# Patient Record
Sex: FEMALE | Race: OTHER | URBAN - METROPOLITAN AREA
[De-identification: names, ages, dates, MRNs, and addresses within clinical notes are randomized per-mention and may not be internally consistent; named-entity substitution may affect disease eponyms.]

---

## 2017-02-04 ENCOUNTER — EMERGENCY (EMERGENCY)
Facility: HOSPITAL | Age: 52
LOS: 0 days | Discharge: HOME | End: 2017-02-04
Attending: EMERGENCY MEDICINE | Admitting: EMERGENCY MEDICINE
Payer: MEDICAID

## 2017-02-04 VITALS
TEMPERATURE: 93 F | SYSTOLIC BLOOD PRESSURE: 142 MMHG | DIASTOLIC BLOOD PRESSURE: 81 MMHG | HEART RATE: 91 BPM | RESPIRATION RATE: 19 BRPM

## 2017-02-04 VITALS — WEIGHT: 164.02 LBS | HEIGHT: 66 IN

## 2017-02-04 DIAGNOSIS — J11.1 INFLUENZA DUE TO UNIDENTIFIED INFLUENZA VIRUS WITH OTHER RESPIRATORY MANIFESTATIONS: ICD-10-CM

## 2017-02-04 LAB
ALBUMIN SERPL ELPH-MCNC: 3.9 G/DL — SIGNIFICANT CHANGE UP (ref 3.3–5)
ALP SERPL-CCNC: 91 U/L — SIGNIFICANT CHANGE UP (ref 40–120)
ALT FLD-CCNC: 41 U/L — SIGNIFICANT CHANGE UP (ref 12–78)
ANION GAP SERPL CALC-SCNC: 9 MMOL/L — SIGNIFICANT CHANGE UP (ref 5–17)
APPEARANCE UR: CLEAR — SIGNIFICANT CHANGE UP
APTT BLD: 32.9 SEC — SIGNIFICANT CHANGE UP (ref 27.5–37.4)
AST SERPL-CCNC: 31 U/L — SIGNIFICANT CHANGE UP (ref 15–37)
BACTERIA # UR AUTO: (no result)
BASOPHILS # BLD AUTO: 0.1 K/UL — SIGNIFICANT CHANGE UP (ref 0–0.2)
BASOPHILS NFR BLD AUTO: 0.8 % — SIGNIFICANT CHANGE UP (ref 0–2)
BILIRUB SERPL-MCNC: 0.5 MG/DL — SIGNIFICANT CHANGE UP (ref 0.2–1.2)
BILIRUB UR-MCNC: NEGATIVE — SIGNIFICANT CHANGE UP
BLD GP AB SCN SERPL QL: SIGNIFICANT CHANGE UP
BUN SERPL-MCNC: 11 MG/DL — SIGNIFICANT CHANGE UP (ref 7–23)
CALCIUM SERPL-MCNC: 9.2 MG/DL — SIGNIFICANT CHANGE UP (ref 8.5–10.1)
CHLORIDE SERPL-SCNC: 103 MMOL/L — SIGNIFICANT CHANGE UP (ref 96–108)
CO2 SERPL-SCNC: 26 MMOL/L — SIGNIFICANT CHANGE UP (ref 22–31)
COLOR SPEC: YELLOW — SIGNIFICANT CHANGE UP
CREAT SERPL-MCNC: 0.82 MG/DL — SIGNIFICANT CHANGE UP (ref 0.5–1.3)
DIFF PNL FLD: (no result)
EOSINOPHIL # BLD AUTO: 0.1 K/UL — SIGNIFICANT CHANGE UP (ref 0–0.5)
EOSINOPHIL NFR BLD AUTO: 0.7 % — SIGNIFICANT CHANGE UP (ref 0–6)
EPI CELLS # UR: (no result)
FLUAV H1 2009 PAND RNA SPEC QL NAA+PROBE: DETECTED
GLUCOSE SERPL-MCNC: 98 MG/DL — SIGNIFICANT CHANGE UP (ref 70–99)
GLUCOSE UR QL: NEGATIVE MG/DL — SIGNIFICANT CHANGE UP
HCT VFR BLD CALC: 46.4 % — HIGH (ref 34.5–45)
HGB BLD-MCNC: 15.5 G/DL — SIGNIFICANT CHANGE UP (ref 11.5–15.5)
INR BLD: 1.29 RATIO — HIGH (ref 0.88–1.16)
KETONES UR-MCNC: (no result)
LACTATE SERPL-SCNC: 1.3 MMOL/L — SIGNIFICANT CHANGE UP (ref 0.7–2)
LEUKOCYTE ESTERASE UR-ACNC: (no result)
LYMPHOCYTES # BLD AUTO: 13.2 % — SIGNIFICANT CHANGE UP (ref 13–44)
LYMPHOCYTES # BLD AUTO: 2 K/UL — SIGNIFICANT CHANGE UP (ref 1–3.3)
MCHC RBC-ENTMCNC: 30.3 PG — SIGNIFICANT CHANGE UP (ref 27–34)
MCHC RBC-ENTMCNC: 33.4 GM/DL — SIGNIFICANT CHANGE UP (ref 32–36)
MCV RBC AUTO: 90.9 FL — SIGNIFICANT CHANGE UP (ref 80–100)
MONOCYTES # BLD AUTO: 1.1 K/UL — HIGH (ref 0–0.9)
MONOCYTES NFR BLD AUTO: 7.3 % — SIGNIFICANT CHANGE UP (ref 2–14)
NEUTROPHILS # BLD AUTO: 12 K/UL — HIGH (ref 1.8–7.4)
NEUTROPHILS NFR BLD AUTO: 78.1 % — HIGH (ref 43–77)
NITRITE UR-MCNC: NEGATIVE — SIGNIFICANT CHANGE UP
PH UR: 6 — SIGNIFICANT CHANGE UP (ref 4.8–8)
PLATELET # BLD AUTO: 254 K/UL — SIGNIFICANT CHANGE UP (ref 150–400)
POTASSIUM SERPL-MCNC: 3.9 MMOL/L — SIGNIFICANT CHANGE UP (ref 3.5–5.3)
POTASSIUM SERPL-SCNC: 3.9 MMOL/L — SIGNIFICANT CHANGE UP (ref 3.5–5.3)
PROT SERPL-MCNC: 9.1 GM/DL — HIGH (ref 6–8.3)
PROT UR-MCNC: 100 MG/DL
PROTHROM AB SERPL-ACNC: 14.2 SEC — HIGH (ref 10–13.1)
RAPID RVP RESULT: DETECTED
RBC # BLD: 5.1 M/UL — SIGNIFICANT CHANGE UP (ref 3.8–5.2)
RBC # FLD: 11.5 % — SIGNIFICANT CHANGE UP (ref 10.3–14.5)
RBC CASTS # UR COMP ASSIST: (no result) /HPF (ref 0–4)
SODIUM SERPL-SCNC: 138 MMOL/L — SIGNIFICANT CHANGE UP (ref 135–145)
SP GR SPEC: 1.01 — SIGNIFICANT CHANGE UP (ref 1.01–1.02)
TROPONIN I SERPL-MCNC: <0.015 NG/ML — SIGNIFICANT CHANGE UP (ref 0.01–0.04)
TYPE + AB SCN PNL BLD: SIGNIFICANT CHANGE UP
UROBILINOGEN FLD QL: NEGATIVE MG/DL — SIGNIFICANT CHANGE UP
WBC # BLD: 15.4 K/UL — HIGH (ref 3.8–10.5)
WBC # FLD AUTO: 15.4 K/UL — HIGH (ref 3.8–10.5)
WBC UR QL: SIGNIFICANT CHANGE UP

## 2017-02-04 PROCEDURE — 99284 EMERGENCY DEPT VISIT MOD MDM: CPT

## 2017-02-04 PROCEDURE — 71010: CPT | Mod: 26

## 2017-02-04 RX ORDER — ACETAMINOPHEN 500 MG
650 TABLET ORAL ONCE
Qty: 0 | Refills: 0 | Status: COMPLETED | OUTPATIENT
Start: 2017-02-04 | End: 2017-02-04

## 2017-02-04 RX ORDER — AZITHROMYCIN 500 MG/1
500 TABLET, FILM COATED ORAL ONCE
Qty: 0 | Refills: 0 | Status: COMPLETED | OUTPATIENT
Start: 2017-02-04 | End: 2017-02-04

## 2017-02-04 RX ORDER — CEFTRIAXONE 500 MG/1
1 INJECTION, POWDER, FOR SOLUTION INTRAMUSCULAR; INTRAVENOUS ONCE
Qty: 0 | Refills: 0 | Status: COMPLETED | OUTPATIENT
Start: 2017-02-04 | End: 2017-02-04

## 2017-02-04 RX ORDER — SODIUM CHLORIDE 9 MG/ML
3 INJECTION INTRAMUSCULAR; INTRAVENOUS; SUBCUTANEOUS ONCE
Qty: 0 | Refills: 0 | Status: DISCONTINUED | OUTPATIENT
Start: 2017-02-04 | End: 2017-02-04

## 2017-02-04 RX ORDER — SODIUM CHLORIDE 9 MG/ML
500 INJECTION INTRAMUSCULAR; INTRAVENOUS; SUBCUTANEOUS
Qty: 0 | Refills: 0 | Status: DISCONTINUED | OUTPATIENT
Start: 2017-02-04 | End: 2017-02-04

## 2017-02-04 RX ORDER — IBUPROFEN 200 MG
1 TABLET ORAL
Qty: 28 | Refills: 0 | OUTPATIENT
Start: 2017-02-04 | End: 2017-02-11

## 2017-02-04 RX ORDER — AZITHROMYCIN 500 MG/1
1 TABLET, FILM COATED ORAL
Qty: 4 | Refills: 0 | OUTPATIENT
Start: 2017-02-04 | End: 2017-02-08

## 2017-02-04 RX ADMIN — SODIUM CHLORIDE 2000 MILLILITER(S): 9 INJECTION INTRAMUSCULAR; INTRAVENOUS; SUBCUTANEOUS at 10:13

## 2017-02-04 RX ADMIN — CEFTRIAXONE 100 GRAM(S): 500 INJECTION, POWDER, FOR SOLUTION INTRAMUSCULAR; INTRAVENOUS at 10:17

## 2017-02-04 RX ADMIN — AZITHROMYCIN 255 MILLIGRAM(S): 500 TABLET, FILM COATED ORAL at 10:35

## 2017-02-04 RX ADMIN — SODIUM CHLORIDE 2000 MILLILITER(S): 9 INJECTION INTRAMUSCULAR; INTRAVENOUS; SUBCUTANEOUS at 10:08

## 2017-02-04 RX ADMIN — Medication 650 MILLIGRAM(S): at 10:06

## 2017-02-04 NOTE — ED PROVIDER NOTE - DETAILS:
I, Gretchen Corona, performed the initial face to face bedside interview with this patient regarding history of present illness, review of symptoms and relevant past medical, social and family history.  I completed an independent physical examination.  I was the initial provider who evaluated this patient. The history, relevant review of systems, past medical and surgical history, medical decision making, and physical examination was documented by the scribe in my presence and I attest to the accuracy of the documentation.

## 2017-02-04 NOTE — ED PROVIDER NOTE - OBJECTIVE STATEMENT
50 yo female with ho htn, not on any medications pw 7 days of fever, cough. no recent travel, no pmd, no allergies

## 2017-02-04 NOTE — ED ADULT NURSE NOTE - DOES PATIENT HAVE ADVANCE DIRECTIVE
Problem: Patient Care Overview (Adult)  Goal: Adult Individualization and Mutuality  Outcome: Ongoing (interventions implemented as appropriate)    01/21/17 2006   Individualization   Patient Specific Preferences To go home   Patient Specific Goals To go home   Patient Specific Interventions Monitoring blood pressures   Mutuality/Individual Preferences   What Anxieties, Fears or Concerns Do You Have About Your Health or Care? none   What Questions Do You Have About Your Health or Care? none   What Information Would Help Us Give You More Personalized Care? nothing at this time       Goal: Discharge Needs Assessment  Outcome: Ongoing (interventions implemented as appropriate)    01/21/17 2006   Discharge Needs Assessment   Concerns To Be Addressed no discharge needs identified   Readmission Within The Last 30 Days no previous admission in last 30 days   Equipment Needed After Discharge none   Discharge Disposition home or self-care   Current Health   Anticipated Changes Related to Illness none   Self-Care   Equipment Currently Used at Home none   Living Environment   Transportation Available car         Problem: Hypertensive Disorders in Pregnancy (Adult,Obstetrics,Pediatric)  Goal: Signs and Symptoms of Listed Potential Problems Will be Absent or Manageable (Hypertensive Disorders in Pregnancy)  Outcome: Ongoing (interventions implemented as appropriate)    01/21/17 2006   Hypertensive Disorders in Pregnancy   Problems Assessed (Hypertensive Disorders in Pregnancy) all   Problems Present (Hypertensive Disorders in Pregnancy) none            No

## 2017-02-04 NOTE — ED PROVIDER NOTE - MEDICAL DECISION MAKING DETAILS
labs, cxray r/o pna, hydration, if lfu too late to treat as pt has been symtomatic for 7 days, will treat for bronchitis and d/c to home

## 2017-02-05 LAB
CULTURE RESULTS: SIGNIFICANT CHANGE UP
SPECIMEN SOURCE: SIGNIFICANT CHANGE UP

## 2017-02-09 LAB
CULTURE RESULTS: SIGNIFICANT CHANGE UP
CULTURE RESULTS: SIGNIFICANT CHANGE UP
SPECIMEN SOURCE: SIGNIFICANT CHANGE UP
SPECIMEN SOURCE: SIGNIFICANT CHANGE UP

## 2018-04-30 ENCOUNTER — APPOINTMENT (OUTPATIENT)
Dept: ULTRASOUND IMAGING | Facility: CLINIC | Age: 53
End: 2018-04-30
Payer: COMMERCIAL

## 2018-04-30 ENCOUNTER — APPOINTMENT (OUTPATIENT)
Dept: MAMMOGRAPHY | Facility: CLINIC | Age: 53
End: 2018-04-30

## 2018-04-30 ENCOUNTER — OUTPATIENT (OUTPATIENT)
Dept: OUTPATIENT SERVICES | Facility: HOSPITAL | Age: 53
LOS: 1 days | End: 2018-04-30
Payer: COMMERCIAL

## 2018-04-30 ENCOUNTER — OUTPATIENT (OUTPATIENT)
Dept: OUTPATIENT SERVICES | Facility: HOSPITAL | Age: 53
LOS: 1 days | End: 2018-04-30

## 2018-04-30 DIAGNOSIS — Z00.8 ENCOUNTER FOR OTHER GENERAL EXAMINATION: ICD-10-CM

## 2018-04-30 PROCEDURE — 76700 US EXAM ABDOM COMPLETE: CPT | Mod: 26

## 2018-04-30 PROCEDURE — 76700 US EXAM ABDOM COMPLETE: CPT

## 2018-04-30 PROCEDURE — 77063 BREAST TOMOSYNTHESIS BI: CPT | Mod: 26

## 2018-04-30 PROCEDURE — 77063 BREAST TOMOSYNTHESIS BI: CPT

## 2018-04-30 PROCEDURE — 77067 SCR MAMMO BI INCL CAD: CPT | Mod: 26

## 2018-04-30 PROCEDURE — 77067 SCR MAMMO BI INCL CAD: CPT

## 2018-11-28 ENCOUNTER — EMERGENCY (EMERGENCY)
Facility: HOSPITAL | Age: 53
LOS: 0 days | Discharge: HOME | End: 2018-11-28
Attending: EMERGENCY MEDICINE
Payer: MEDICAID

## 2018-11-28 VITALS
OXYGEN SATURATION: 98 % | SYSTOLIC BLOOD PRESSURE: 128 MMHG | HEART RATE: 87 BPM | RESPIRATION RATE: 16 BRPM | DIASTOLIC BLOOD PRESSURE: 74 MMHG

## 2018-11-28 VITALS
OXYGEN SATURATION: 98 % | TEMPERATURE: 98 F | HEART RATE: 94 BPM | DIASTOLIC BLOOD PRESSURE: 81 MMHG | RESPIRATION RATE: 18 BRPM | SYSTOLIC BLOOD PRESSURE: 136 MMHG

## 2018-11-28 PROBLEM — I10 ESSENTIAL (PRIMARY) HYPERTENSION: Chronic | Status: ACTIVE | Noted: 2017-02-04

## 2018-11-28 LAB
ALBUMIN SERPL ELPH-MCNC: 4.3 G/DL — SIGNIFICANT CHANGE UP (ref 3.3–5)
ALP SERPL-CCNC: 124 U/L — HIGH (ref 40–120)
ALT FLD-CCNC: 93 U/L — HIGH (ref 12–78)
ANION GAP SERPL CALC-SCNC: 8 MMOL/L — SIGNIFICANT CHANGE UP (ref 5–17)
AST SERPL-CCNC: 53 U/L — HIGH (ref 15–37)
BASOPHILS # BLD AUTO: 0.07 K/UL — SIGNIFICANT CHANGE UP (ref 0–0.2)
BASOPHILS NFR BLD AUTO: 0.8 % — SIGNIFICANT CHANGE UP (ref 0–2)
BILIRUB SERPL-MCNC: 0.4 MG/DL — SIGNIFICANT CHANGE UP (ref 0.2–1.2)
BUN SERPL-MCNC: 12 MG/DL — SIGNIFICANT CHANGE UP (ref 7–23)
CALCIUM SERPL-MCNC: 9.4 MG/DL — SIGNIFICANT CHANGE UP (ref 8.5–10.1)
CHLORIDE SERPL-SCNC: 104 MMOL/L — SIGNIFICANT CHANGE UP (ref 96–108)
CO2 SERPL-SCNC: 29 MMOL/L — SIGNIFICANT CHANGE UP (ref 22–31)
CREAT SERPL-MCNC: 0.81 MG/DL — SIGNIFICANT CHANGE UP (ref 0.5–1.3)
EOSINOPHIL # BLD AUTO: 0.14 K/UL — SIGNIFICANT CHANGE UP (ref 0–0.5)
EOSINOPHIL NFR BLD AUTO: 1.6 % — SIGNIFICANT CHANGE UP (ref 0–6)
GLUCOSE SERPL-MCNC: 107 MG/DL — HIGH (ref 70–99)
HCT VFR BLD CALC: 46.9 % — HIGH (ref 34.5–45)
HGB BLD-MCNC: 15.9 G/DL — HIGH (ref 11.5–15.5)
IMM GRANULOCYTES NFR BLD AUTO: 0.3 % — SIGNIFICANT CHANGE UP (ref 0–1.5)
LYMPHOCYTES # BLD AUTO: 2.44 K/UL — SIGNIFICANT CHANGE UP (ref 1–3.3)
LYMPHOCYTES # BLD AUTO: 27.4 % — SIGNIFICANT CHANGE UP (ref 13–44)
MCHC RBC-ENTMCNC: 30.3 PG — SIGNIFICANT CHANGE UP (ref 27–34)
MCHC RBC-ENTMCNC: 33.9 GM/DL — SIGNIFICANT CHANGE UP (ref 32–36)
MCV RBC AUTO: 89.5 FL — SIGNIFICANT CHANGE UP (ref 80–100)
MONOCYTES # BLD AUTO: 0.6 K/UL — SIGNIFICANT CHANGE UP (ref 0–0.9)
MONOCYTES NFR BLD AUTO: 6.7 % — SIGNIFICANT CHANGE UP (ref 2–14)
NEUTROPHILS # BLD AUTO: 5.61 K/UL — SIGNIFICANT CHANGE UP (ref 1.8–7.4)
NEUTROPHILS NFR BLD AUTO: 63.2 % — SIGNIFICANT CHANGE UP (ref 43–77)
PLATELET # BLD AUTO: 243 K/UL — SIGNIFICANT CHANGE UP (ref 150–400)
POTASSIUM SERPL-MCNC: 3.8 MMOL/L — SIGNIFICANT CHANGE UP (ref 3.5–5.3)
POTASSIUM SERPL-SCNC: 3.8 MMOL/L — SIGNIFICANT CHANGE UP (ref 3.5–5.3)
PROT SERPL-MCNC: 8.9 GM/DL — HIGH (ref 6–8.3)
RBC # BLD: 5.24 M/UL — HIGH (ref 3.8–5.2)
RBC # FLD: 12.6 % — SIGNIFICANT CHANGE UP (ref 10.3–14.5)
SODIUM SERPL-SCNC: 141 MMOL/L — SIGNIFICANT CHANGE UP (ref 135–145)
WBC # BLD: 8.89 K/UL — SIGNIFICANT CHANGE UP (ref 3.8–10.5)
WBC # FLD AUTO: 8.89 K/UL — SIGNIFICANT CHANGE UP (ref 3.8–10.5)

## 2018-11-28 PROCEDURE — 70450 CT HEAD/BRAIN W/O DYE: CPT | Mod: 26

## 2018-11-28 PROCEDURE — 99284 EMERGENCY DEPT VISIT MOD MDM: CPT

## 2018-11-28 PROCEDURE — 93010 ELECTROCARDIOGRAM REPORT: CPT

## 2018-11-28 RX ORDER — SODIUM CHLORIDE 9 MG/ML
2000 INJECTION INTRAMUSCULAR; INTRAVENOUS; SUBCUTANEOUS ONCE
Qty: 0 | Refills: 0 | Status: COMPLETED | OUTPATIENT
Start: 2018-11-28 | End: 2018-11-28

## 2018-11-28 RX ORDER — ACETAMINOPHEN 500 MG
1000 TABLET ORAL ONCE
Qty: 0 | Refills: 0 | Status: COMPLETED | OUTPATIENT
Start: 2018-11-28 | End: 2018-11-28

## 2018-11-28 RX ORDER — METOCLOPRAMIDE HCL 10 MG
10 TABLET ORAL ONCE
Qty: 0 | Refills: 0 | Status: COMPLETED | OUTPATIENT
Start: 2018-11-28 | End: 2018-11-28

## 2018-11-28 RX ADMIN — SODIUM CHLORIDE 2000 MILLILITER(S): 9 INJECTION INTRAMUSCULAR; INTRAVENOUS; SUBCUTANEOUS at 12:48

## 2018-11-28 RX ADMIN — Medication 1000 MILLIGRAM(S): at 11:12

## 2018-11-28 RX ADMIN — Medication 10 MILLIGRAM(S): at 12:15

## 2018-11-28 RX ADMIN — Medication 1000 MILLIGRAM(S): at 11:10

## 2018-11-28 RX ADMIN — SODIUM CHLORIDE 2000 MILLILITER(S): 9 INJECTION INTRAMUSCULAR; INTRAVENOUS; SUBCUTANEOUS at 10:40

## 2018-11-28 RX ADMIN — Medication 400 MILLIGRAM(S): at 10:42

## 2018-11-28 NOTE — ED PROVIDER NOTE - PROGRESS NOTE DETAILS
patient feeling better. symptoms resolved labs ct unremarkable. will d/c with follow up. Juliano Lieberman M.D., Attending Physician

## 2018-11-28 NOTE — ED ADULT TRIAGE NOTE - CHIEF COMPLAINT QUOTE
patient ambulated in with a steady gait. states she was at work 3 days ago when she developed a headache, nausea and vomiting. patient reports also having some lower leg swelling. hx of HTN.

## 2018-11-28 NOTE — ED PROVIDER NOTE - CARE PLAN
Principal Discharge DX:	Headache  Assessment and plan of treatment:	1. return for worsening symptoms or anything concerning to you  2. take all home meds as prescribed  3. follow up with your pmd call to make an appointment

## 2018-11-28 NOTE — ED PROVIDER NOTE - PHYSICAL EXAMINATION
Constitutional: mild distress AAOx3  Eyes: PERRLA EOMI  Head: Normocephalic atraumatic  Mouth: MMM  Cardiac: regular rate   Resp: Lungs CTAB  GI: Abd s/nt/nd  Neuro: CN2-12 intact  Skin: No rashes Constitutional: mild distress AAOx3  Eyes: PERRLA EOMI  Head: Normocephalic atraumatic  Mouth: MMM  Cardiac: regular rate normal peripheral pulses  Resp: Lungs CTAB  GI: Abd s/nt/nd  Neuro: CN2-12 intact normal strength sensation coordination  Skin: No rashes

## 2018-11-28 NOTE — ED PROVIDER NOTE - NS ED ROS FT
Constitutional: No fever or chills  Eyes: +blurry vision  HEENT: No throat pain  CV: No chest pain  Resp: No SOB no cough  GI: No abd pain. +nausea +vomiting  : No dysuria  MSK: No musculoskeletal pain  Skin: No rash  Neuro: +headache

## 2018-11-28 NOTE — ED PROVIDER NOTE - NS_ ATTENDINGSCRIBEDETAILS _ED_A_ED_FT
I, Juliano Lieberman MD,  performed the initial face to face bedside interview with this patient regarding history of present illness, review of symptoms and relevant past medical, social and family history.  I completed an independent physical examination.  I was the initial provider who evaluated this patient.  The history, relevant review of systems, past medical and surgical history, medical decision making, and physical examination was documented by the scribe in my presence and I attest to the accuracy of the documentation.

## 2018-11-28 NOTE — ED PROVIDER NOTE - MEDICAL DECISION MAKING DETAILS
54 y/o female presents to the ED for HA since Monday, comes and goes. [place] is associated with nausea, vomiting. No neck pain, chest pain, SOB, or fever. Has had similar HAs before, but not for a while. Exam is nonfocal. Pt with likely migraine, very low suspicion for infection or SAH. Will obtain labs, symptom control, and reassess.

## 2018-11-28 NOTE — ED PROVIDER NOTE - OBJECTIVE STATEMENT
54 y/o female with a PMHx of HTN presents to the ED c/o intermittent HA x3 days. 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 Pt has had HAs before, but symptoms haven't lasted this long in the past. HA is associated with nausea, vomiting, blurry vision. No numbness, weakness, tingling, chest pain, SOB, fever, neck pain. Non smoker. No EtOH use. NKDA. [employer]  used, ID#: [id number].

## 2018-11-29 DIAGNOSIS — R51 HEADACHE: ICD-10-CM

## 2018-11-29 DIAGNOSIS — Z79.899 OTHER LONG TERM (CURRENT) DRUG THERAPY: ICD-10-CM

## 2018-11-29 DIAGNOSIS — H53.8 OTHER VISUAL DISTURBANCES: ICD-10-CM

## 2018-11-29 DIAGNOSIS — I10 ESSENTIAL (PRIMARY) HYPERTENSION: ICD-10-CM

## 2018-11-29 DIAGNOSIS — R11.2 NAUSEA WITH VOMITING, UNSPECIFIED: ICD-10-CM

## 2019-04-17 ENCOUNTER — OUTPATIENT (OUTPATIENT)
Dept: OUTPATIENT SERVICES | Facility: HOSPITAL | Age: 54
LOS: 1 days | End: 2019-04-17
Payer: MEDICAID

## 2019-04-17 ENCOUNTER — APPOINTMENT (OUTPATIENT)
Dept: RADIOLOGY | Facility: CLINIC | Age: 54
End: 2019-04-17
Payer: COMMERCIAL

## 2019-04-17 DIAGNOSIS — Z00.8 ENCOUNTER FOR OTHER GENERAL EXAMINATION: ICD-10-CM

## 2019-04-17 PROCEDURE — 73564 X-RAY EXAM KNEE 4 OR MORE: CPT | Mod: 26,RT

## 2019-04-17 PROCEDURE — 73564 X-RAY EXAM KNEE 4 OR MORE: CPT

## 2019-07-01 ENCOUNTER — APPOINTMENT (OUTPATIENT)
Dept: ULTRASOUND IMAGING | Facility: CLINIC | Age: 54
End: 2019-07-01
Payer: COMMERCIAL

## 2019-07-01 ENCOUNTER — OUTPATIENT (OUTPATIENT)
Dept: OUTPATIENT SERVICES | Facility: HOSPITAL | Age: 54
LOS: 1 days | End: 2019-07-01
Payer: MEDICAID

## 2019-07-01 DIAGNOSIS — Z00.8 ENCOUNTER FOR OTHER GENERAL EXAMINATION: ICD-10-CM

## 2019-07-01 PROCEDURE — 76700 US EXAM ABDOM COMPLETE: CPT

## 2019-07-01 PROCEDURE — 76700 US EXAM ABDOM COMPLETE: CPT | Mod: 26

## 2019-07-18 ENCOUNTER — APPOINTMENT (OUTPATIENT)
Dept: ORTHOPEDIC SURGERY | Facility: CLINIC | Age: 54
End: 2019-07-18
Payer: COMMERCIAL

## 2019-07-18 PROCEDURE — 99204 OFFICE O/P NEW MOD 45 MIN: CPT | Mod: 25

## 2019-07-18 PROCEDURE — 73562 X-RAY EXAM OF KNEE 3: CPT | Mod: RT

## 2019-07-18 PROCEDURE — 20610 DRAIN/INJ JOINT/BURSA W/O US: CPT | Mod: RT

## 2019-07-18 NOTE — PROCEDURE
[de-identified] : Laterality: Right Knee\par \par The risks and benefits of the injection were reviewed with the patient today in office and all their questions were answered.  The injection site was the lateral aspect of the knee.  Prior to giving the injection the injection site was prepped with Chloraprep and a sterile field was created.  Sterile technique was carried out throughout the procedure.  After verbal consent from the patient we went ahead and injected the right knee today with 1 ml 40 mg Depo-Medrol, 5 ml 1% lidocaine and 4 ml of .5% Bupivacaine for a total of 10 ml with a 22 arben 1.5" needle.  The medication was placed into the suprapatellar pouch without complication.  Post injection the patient reported no pain, had a normal gait and good motion of the knee.  The patient denied numbness and tingling going down their leg.  There were no complications during the procedure.

## 2019-07-18 NOTE — ADDENDUM
[FreeTextEntry1] : I, Zoltan Luis Miguel, acted solely as a scribe for Dr. Bishnu Dimas on this date 07/18/2019 .\par All medical record entries made by the Scribe were at my, Dr. Bishnu Dimas, direction and personally dictated by me on 07/18/2019 . I have reviewed the chart and agree that the record accurately reflects my personal performance of the history, physical exam, assessment and plan. I have also personally directed, reviewed, and agreed with the chart.\par \par \par

## 2019-07-18 NOTE — HISTORY OF PRESENT ILLNESS
[de-identified] : 53 year year old female presenting with right knee pain. The patient’s pain is noted to be a 6-7/10. The pain is described as intermittent and shooting. The patient's pain began in 2015 when she fell on her knee in [place], and has had moderate pain since. The patient is pre-diabetic. The patient has not been attending physical therapy. She is currently taking NSAIDS. No other complaints at this time.\par \par \par

## 2019-07-18 NOTE — PHYSICAL EXAM
[de-identified] : General: Alert and oriented x3. In no acute distress. Pleasant in nature with a normal affect. No apparent respiratory distress.\par R Knee Exam\par Skin: Clean, dry, intact\par Inspection: No obvious malalignment, no masses, no swelling, no effusion\par Pulses: 2+ DP/PT pulses\par ROM: R Knee 0-110 degrees of flexion. No pain with deep knee flexion.\par Tenderness: No MJLT. No LJLT. No pain over the patella facets. No pain to the quadriceps mechanism.\par Stability: Stable to varus, valgus, lachman testing. Negative anterior/posterior drawer.\par Strength: 5/5 Q/H/TA/GS/EHL, no atrophy\par Neuro: In tact to light touch throughout, DTR's normal\par Additional tests: Negative McMurrays test, Negative patellar grind test.\par \par \par   [de-identified] : 3V of the right knee were ordered obtained and reviewed by me today, 07/18/2019 , revealed: arthritis\par \par \par

## 2019-07-18 NOTE — DISCUSSION/SUMMARY
[de-identified] : Today I had a lengthy discussion with the patient regarding their right knee pain.I have addressed all the patient's concerns surrounding the pathology of their condition. A discussion was had about a possible cortisone injection. The patient wanted to follow through with the injection, and it was provided in the office today. The patient tolerated the injection well. I would like to see the patient back in the office in 2-3 months PRN to reassess their condition. The patient understood and verbally agreed to the treatment plan. All of their questions were answered and they were satisfied with the visit. The patient should call the office if they have any questions or experience worsening symptoms.\par \par \par

## 2019-07-18 NOTE — CONSULT LETTER
[Consult Letter:] : I had the pleasure of evaluating your patient, [unfilled]. [Please see my note below.] : Please see my note below. [Consult Closing:] : Thank you very much for allowing me to participate in the care of this patient.  If you have any questions, please do not hesitate to contact me. [Sincerely,] : Sincerely, [FreeTextEntry3] : Bishnu Dimas, DO\par Foot and Ankle Surgery\par

## 2019-08-26 ENCOUNTER — APPOINTMENT (OUTPATIENT)
Dept: MAMMOGRAPHY | Facility: CLINIC | Age: 54
End: 2019-08-26
Payer: COMMERCIAL

## 2019-08-26 ENCOUNTER — OUTPATIENT (OUTPATIENT)
Dept: OUTPATIENT SERVICES | Facility: HOSPITAL | Age: 54
LOS: 1 days | End: 2019-08-26
Payer: MEDICAID

## 2019-08-26 DIAGNOSIS — Z00.8 ENCOUNTER FOR OTHER GENERAL EXAMINATION: ICD-10-CM

## 2019-08-26 PROCEDURE — 77063 BREAST TOMOSYNTHESIS BI: CPT | Mod: 26

## 2019-08-26 PROCEDURE — 77067 SCR MAMMO BI INCL CAD: CPT

## 2019-08-26 PROCEDURE — 77067 SCR MAMMO BI INCL CAD: CPT | Mod: 26

## 2019-08-26 PROCEDURE — 77063 BREAST TOMOSYNTHESIS BI: CPT

## 2019-10-14 ENCOUNTER — APPOINTMENT (OUTPATIENT)
Dept: ORTHOPEDIC SURGERY | Facility: CLINIC | Age: 54
End: 2019-10-14
Payer: COMMERCIAL

## 2019-10-14 DIAGNOSIS — G89.29 PAIN IN RIGHT KNEE: ICD-10-CM

## 2019-10-14 DIAGNOSIS — M25.561 PAIN IN RIGHT KNEE: ICD-10-CM

## 2019-10-14 DIAGNOSIS — M23.91 UNSPECIFIED INTERNAL DERANGEMENT OF RIGHT KNEE: ICD-10-CM

## 2019-10-14 PROCEDURE — 20610 DRAIN/INJ JOINT/BURSA W/O US: CPT | Mod: RT

## 2019-10-14 PROCEDURE — 99213 OFFICE O/P EST LOW 20 MIN: CPT | Mod: 25

## 2019-10-14 NOTE — PHYSICAL EXAM
[de-identified] : General: Alert and oriented x3. In no acute distress. Pleasant in nature with a normal affect. No apparent respiratory distress.\par R Knee Exam\par Skin: Clean, dry, intact\par Inspection: No obvious malalignment, no masses, no swelling, no effusion\par Pulses: 2+ DP/PT pulses\par ROM: R Knee 0-110 degrees of flexion. No pain with deep knee flexion.\par Tenderness: No MJLT. No LJLT. No pain over the patella facets. No pain to the quadriceps mechanism.\par Stability: Stable to varus, valgus, lachman testing. Negative anterior/posterior drawer.\par Strength: 5/5 Q/H/TA/GS/EHL, no atrophy\par Neuro: In tact to light touch throughout, DTR's normal\par Additional tests: Negative McMurrays test, Negative patellar grind test.  [de-identified] : None new obtained.

## 2019-10-14 NOTE — HISTORY OF PRESENT ILLNESS
[de-identified] : 54 year old female presenting with right knee pain. The patient’s pain is noted to be a 5/10. The pain and swelling are noted to be the same compared to the previous visit. She had a cortisone injection last visit which provided relief for that day only.She is currently taking no pain medication. The patient is pre-diabetic. No other complaints at this time.\par \par \par

## 2019-10-14 NOTE — ADDENDUM
[FreeTextEntry1] : I, Zoltan Luis Miguel, acted solely as a scribe for Dr. Bishnu Dimas on this date 10/14/2019 .\par All medical record entries made by the Scribe were at my, Dr. Bishnu Dimas, direction and personally dictated by me on 10/14/2019 . I have reviewed the chart and agree that the record accurately reflects my personal performance of the history, physical exam, assessment and plan. I have also personally directed, reviewed, and agreed with the chart.\par \par \par

## 2019-10-14 NOTE — PROCEDURE
[de-identified] : Laterality: R Knee\par The risks and benefits of the injection were reviewed with the patient today in office and all their questions were answered.  The injection site was the anterolateral aspect of the knee with the patient sitting up and the knees flexed to 90 degrees.  Prior to giving the injection the injection site was prepped with Chloraprep and a sterile field was created.  Sterile technique was carried out throughout the procedure.  After verbal consent from the patient we went ahead and injected the right knee today with 1 ml 40 mg Depo-Medrol, 5 ml 1% lidocaine and 4 ml of .5% Bupivacaine for a total of 10 ml with a 22 arben 1.5" needle.  The medication was placed into the knee without complication.  Post injection the patient reported no pain, had a normal gait and good motion of the knee.  The patient denied numbness and tingling going down their leg.  There were no complications during the procedure.

## 2019-10-14 NOTE — DISCUSSION/SUMMARY
[de-identified] : Today I had a lengthy discussion with the patient regarding their right knee pain.I have addressed all the patient's concerns surrounding the pathology of their condition. A discussion was had about a possible cortisone injection for the right knee. The patient wanted to move forward with the procedure. The injection was administered in the office today. The patient tolerated the procedure well with no resistance. I would like to see the patient back in the office in 2-3 months PRN to reassess their condition. The patient understood and verbally agreed to the treatment plan. All of their questions were answered and they were satisfied with the visit. The patient should call the office if they have any questions or experience worsening symptoms.\par \par \par

## 2020-07-28 ENCOUNTER — RESULT REVIEW (OUTPATIENT)
Age: 55
End: 2020-07-28

## 2020-11-19 NOTE — ED ADULT NURSE NOTE - CAS TRG GEN SKIN CONDITION
Patient has tried the following programs to lose weight in the past, but none have yielded substantial, long-term success:    No Atkins   No Herbal Life    No Ignacioclary Carroll  No [place] Weight Loss    No Liquid protein fast  No Medifast    No Optifast   No Overeaters Anonymous    No Crystal Silvan  No Slim Fast    No Saima Powter  No TOPS    No [place] [employer]  No Dexatrim    No Redux   No Fenfluramine    No Meridia   No Phentermine    No Xenical   No Physician Supervised    Yes Self-Imposed      Other: Low carb    Total number of years dieting: Dieting on and off for 3 years Warm

## 2021-04-06 ENCOUNTER — EMERGENCY (EMERGENCY)
Facility: HOSPITAL | Age: 56
LOS: 0 days | Discharge: HOME | End: 2021-04-06
Attending: EMERGENCY MEDICINE
Payer: MEDICAID

## 2021-04-06 VITALS
TEMPERATURE: 98 F | RESPIRATION RATE: 18 BRPM | HEART RATE: 71 BPM | DIASTOLIC BLOOD PRESSURE: 74 MMHG | OXYGEN SATURATION: 100 % | SYSTOLIC BLOOD PRESSURE: 126 MMHG

## 2021-04-06 VITALS
DIASTOLIC BLOOD PRESSURE: 93 MMHG | RESPIRATION RATE: 18 BRPM | HEART RATE: 81 BPM | TEMPERATURE: 98 F | OXYGEN SATURATION: 97 % | SYSTOLIC BLOOD PRESSURE: 132 MMHG

## 2021-04-06 DIAGNOSIS — R19.7 DIARRHEA, UNSPECIFIED: ICD-10-CM

## 2021-04-06 DIAGNOSIS — E86.0 DEHYDRATION: ICD-10-CM

## 2021-04-06 DIAGNOSIS — R42 DIZZINESS AND GIDDINESS: ICD-10-CM

## 2021-04-06 DIAGNOSIS — I10 ESSENTIAL (PRIMARY) HYPERTENSION: ICD-10-CM

## 2021-04-06 DIAGNOSIS — R73.03 PREDIABETES: ICD-10-CM

## 2021-04-06 LAB
ALBUMIN SERPL ELPH-MCNC: 3.9 G/DL — SIGNIFICANT CHANGE UP (ref 3.3–5)
ALP SERPL-CCNC: 103 U/L — SIGNIFICANT CHANGE UP (ref 40–120)
ALT FLD-CCNC: 78 U/L — SIGNIFICANT CHANGE UP (ref 12–78)
ANION GAP SERPL CALC-SCNC: 8 MMOL/L — SIGNIFICANT CHANGE UP (ref 5–17)
APPEARANCE UR: CLEAR — SIGNIFICANT CHANGE UP
AST SERPL-CCNC: 64 U/L — HIGH (ref 15–37)
BASOPHILS # BLD AUTO: 0.03 K/UL — SIGNIFICANT CHANGE UP (ref 0–0.2)
BASOPHILS NFR BLD AUTO: 0.4 % — SIGNIFICANT CHANGE UP (ref 0–2)
BILIRUB SERPL-MCNC: 0.5 MG/DL — SIGNIFICANT CHANGE UP (ref 0.2–1.2)
BILIRUB UR-MCNC: NEGATIVE — SIGNIFICANT CHANGE UP
BUN SERPL-MCNC: 13 MG/DL — SIGNIFICANT CHANGE UP (ref 7–23)
CALCIUM SERPL-MCNC: 8.7 MG/DL — SIGNIFICANT CHANGE UP (ref 8.5–10.1)
CHLORIDE SERPL-SCNC: 108 MMOL/L — SIGNIFICANT CHANGE UP (ref 96–108)
CO2 SERPL-SCNC: 24 MMOL/L — SIGNIFICANT CHANGE UP (ref 22–31)
COLOR SPEC: YELLOW — SIGNIFICANT CHANGE UP
CREAT SERPL-MCNC: 0.84 MG/DL — SIGNIFICANT CHANGE UP (ref 0.5–1.3)
DIFF PNL FLD: NEGATIVE — SIGNIFICANT CHANGE UP
EOSINOPHIL # BLD AUTO: 0.15 K/UL — SIGNIFICANT CHANGE UP (ref 0–0.5)
EOSINOPHIL NFR BLD AUTO: 1.8 % — SIGNIFICANT CHANGE UP (ref 0–6)
GLUCOSE SERPL-MCNC: 97 MG/DL — SIGNIFICANT CHANGE UP (ref 70–99)
GLUCOSE UR QL: NEGATIVE MG/DL — SIGNIFICANT CHANGE UP
HCT VFR BLD CALC: 47.2 % — HIGH (ref 34.5–45)
HGB BLD-MCNC: 16.1 G/DL — HIGH (ref 11.5–15.5)
IMM GRANULOCYTES NFR BLD AUTO: 0.2 % — SIGNIFICANT CHANGE UP (ref 0–1.5)
KETONES UR-MCNC: ABNORMAL
LEUKOCYTE ESTERASE UR-ACNC: ABNORMAL
LIDOCAIN IGE QN: 61 U/L — LOW (ref 73–393)
LYMPHOCYTES # BLD AUTO: 2.25 K/UL — SIGNIFICANT CHANGE UP (ref 1–3.3)
LYMPHOCYTES # BLD AUTO: 26.6 % — SIGNIFICANT CHANGE UP (ref 13–44)
MAGNESIUM SERPL-MCNC: 2.3 MG/DL — SIGNIFICANT CHANGE UP (ref 1.6–2.6)
MCHC RBC-ENTMCNC: 30.4 PG — SIGNIFICANT CHANGE UP (ref 27–34)
MCHC RBC-ENTMCNC: 34.1 GM/DL — SIGNIFICANT CHANGE UP (ref 32–36)
MCV RBC AUTO: 89.1 FL — SIGNIFICANT CHANGE UP (ref 80–100)
MONOCYTES # BLD AUTO: 0.88 K/UL — SIGNIFICANT CHANGE UP (ref 0–0.9)
MONOCYTES NFR BLD AUTO: 10.4 % — SIGNIFICANT CHANGE UP (ref 2–14)
NEUTROPHILS # BLD AUTO: 5.13 K/UL — SIGNIFICANT CHANGE UP (ref 1.8–7.4)
NEUTROPHILS NFR BLD AUTO: 60.6 % — SIGNIFICANT CHANGE UP (ref 43–77)
NITRITE UR-MCNC: NEGATIVE — SIGNIFICANT CHANGE UP
PH UR: 5 — SIGNIFICANT CHANGE UP (ref 5–8)
PHOSPHATE SERPL-MCNC: 2.3 MG/DL — LOW (ref 2.5–4.5)
PLATELET # BLD AUTO: 262 K/UL — SIGNIFICANT CHANGE UP (ref 150–400)
POTASSIUM SERPL-MCNC: 3.5 MMOL/L — SIGNIFICANT CHANGE UP (ref 3.5–5.3)
POTASSIUM SERPL-SCNC: 3.5 MMOL/L — SIGNIFICANT CHANGE UP (ref 3.5–5.3)
PROT SERPL-MCNC: 8.5 GM/DL — HIGH (ref 6–8.3)
PROT UR-MCNC: 30 MG/DL
RBC # BLD: 5.3 M/UL — HIGH (ref 3.8–5.2)
RBC # FLD: 12.9 % — SIGNIFICANT CHANGE UP (ref 10.3–14.5)
SODIUM SERPL-SCNC: 140 MMOL/L — SIGNIFICANT CHANGE UP (ref 135–145)
SP GR SPEC: 1.01 — SIGNIFICANT CHANGE UP (ref 1.01–1.02)
UROBILINOGEN FLD QL: NEGATIVE MG/DL — SIGNIFICANT CHANGE UP
WBC # BLD: 8.46 K/UL — SIGNIFICANT CHANGE UP (ref 3.8–10.5)
WBC # FLD AUTO: 8.46 K/UL — SIGNIFICANT CHANGE UP (ref 3.8–10.5)

## 2021-04-06 PROCEDURE — 87086 URINE CULTURE/COLONY COUNT: CPT

## 2021-04-06 PROCEDURE — 99283 EMERGENCY DEPT VISIT LOW MDM: CPT | Mod: 25

## 2021-04-06 PROCEDURE — 93010 ELECTROCARDIOGRAM REPORT: CPT

## 2021-04-06 PROCEDURE — 83690 ASSAY OF LIPASE: CPT

## 2021-04-06 PROCEDURE — 80053 COMPREHEN METABOLIC PANEL: CPT

## 2021-04-06 PROCEDURE — 85025 COMPLETE CBC W/AUTO DIFF WBC: CPT

## 2021-04-06 PROCEDURE — 96360 HYDRATION IV INFUSION INIT: CPT

## 2021-04-06 PROCEDURE — 93005 ELECTROCARDIOGRAM TRACING: CPT

## 2021-04-06 PROCEDURE — 84100 ASSAY OF PHOSPHORUS: CPT

## 2021-04-06 PROCEDURE — 36415 COLL VENOUS BLD VENIPUNCTURE: CPT

## 2021-04-06 PROCEDURE — 83735 ASSAY OF MAGNESIUM: CPT

## 2021-04-06 PROCEDURE — 81001 URINALYSIS AUTO W/SCOPE: CPT

## 2021-04-06 PROCEDURE — 99285 EMERGENCY DEPT VISIT HI MDM: CPT

## 2021-04-06 RX ORDER — SODIUM CHLORIDE 9 MG/ML
2000 INJECTION INTRAMUSCULAR; INTRAVENOUS; SUBCUTANEOUS ONCE
Refills: 0 | Status: COMPLETED | OUTPATIENT
Start: 2021-04-06 | End: 2021-04-06

## 2021-04-06 RX ADMIN — SODIUM CHLORIDE 2000 MILLILITER(S): 9 INJECTION INTRAMUSCULAR; INTRAVENOUS; SUBCUTANEOUS at 11:49

## 2021-04-06 RX ADMIN — SODIUM CHLORIDE 4000 MILLILITER(S): 9 INJECTION INTRAMUSCULAR; INTRAVENOUS; SUBCUTANEOUS at 10:49

## 2021-04-06 NOTE — ED STATDOCS - NSFOLLOWUPINSTRUCTIONS_ED_ALL_ED_FT
Deshidratación en los adultos    Dehydration, Adult      La deshidratación es emilio afección que se caracteriza por emilio cantidad insuficiente de agua u otros líquidos en el organismo. [place] sucede cuando emilio persona pierde más líquidos de los que consume. Los órganos importantes, radha los riñones, el cerebro y el corazón, no pueden funcionar sin la cantidad adecuada de líquidos. Cualquier pérdida de líquidos del organismo puede causar deshidratación.    La deshidratación puede ser leve, moderada o grave. Debe tratarse de inmediato para evitar que se agrave.      ¿Cuáles son las causas?  Las causas de la deshidratación pueden ser las siguientes:  •Afecciones que hacen que se pierda agua u otros líquidos, radha diarrea, vómitos, o sudar u orinar mucho.      •No beber suficientes líquidos, especialmente cuando está enfermo o realiza actividades que requieren mucha energía.      •Otras enfermedades y afecciones, radha fiebre o infección.      •Determinados medicamentos, radha aquellos que eliminan el exceso de líquido del cuerpo (diuréticos).      •Falta de agua potable deng.      •No poder obtener suficiente agua y alimentos.        ¿Qué incrementa el riesgo?  Los siguientes factores pueden hacer que sea más propenso a desarrollar esta afección:  •Tener emilio enfermedad prolongada (crónica) que no se ha tratado adecuadamente, radha diabetes, enfermedad cardíaca o enfermedad renal.      •Ser mayor de 65 años de edad.      •Tener emilio discapacidad.      •Vivir en un lugar de gran altitud, donde el aire menos denso y más seco causa más pérdida de líquidos.      •Hacer ejercicios que sobrecargan el cuerpo ashley mucho tiempo (deportes de resistencia).        ¿Cuáles son los signos o síntomas?    Los síntomas de deshidratación dependen de coyle gravedad.    Deshidratación leve o moderada     •Sed.      •Sequedad en los labios o la boca.      •Mareos o sensación de desvanecimiento, especialmente al ponerse de pie después de estar sentado.       •Calambres musculares.       •Orina de color oscuro. La orina puede ser color té.       •Heri producción de orina o lágrimas que lo normal.      •Dolor de rowan.      Deshidratación grave     •Cambios en la piel. La piel puede estar fría y pegajosa, con manchas o pálida. También es posible que la piel no vuelva a la normalidad después de pellizcarla ligeramente y soltarla.      •Escasa producción o ausencia de lágrimas, orina o sudor.      •Cambios en los signos vitales, radha respiración rápida y presión arterial baja. El pulso puede estar débil o puede tener más de 100 latidos por minuto cuando está sentado quieto.    •Otros cambios, por ejemplo:  •Sentir mucha sed.      •Ojos hundidos.      •Ina y pies fríos.      •Confusión.      •Estar muy cansado (aletargado) o tener problemas para despertarse.      •Pérdida de peso a corto plazo.      •Pérdida de la conciencia.          ¿Cómo se diagnostica?    Esta afección se diagnostica en función de los síntomas y de un examen físico. Se le pueden hacer análisis de dago y orina para ayudar a confirmar el diagnóstico.      ¿Cómo se trata?  El tratamiento de esta afección depende de coyle gravedad. El tratamiento se debe comenzar de inmediato. No espere hasta que la deshidratación sea grave. La deshidratación grave es emilio emergencia y debe tratarse en un hospital.•La deshidratación leve o moderada puede tratarse en la casa. Le pedirán que:   •Shanon más líquidos.      •Shanon emilio solución de rehidratación oral (SRO). Esta bebida ayuda a restablecer las cantidades adecuadas de líquidos y de sales y minerales en la dago (electrolitos).      •La deshidratación grave puede tratarse de la siguiente manera:  •Con líquidos intravenosos (i.v.).      •Corrigiendo los niveles anormales de electrolitos. A menudo, esto se realiza administrando electrolitos a través de un tubo que se pasa por la nariz hasta llegar al estómago (sonda nasogástrica o sonda NG).      •Tratando la causa subyacente de la deshidratación.          Siga estas instrucciones en coyle casa:    Solución de rehidratación oral   Si se lo indicó el médico, tome emilio SRO:  •Para preparar emilio SRO, siga las instrucciones del envase.      •Comience por beber pequeñas cantidades, aproximadamente ½ taza (120 ml) cada 5 a 10 minutos.      •Aumente lentamente la cantidad que carlos manuel hasta que haya ingerido la cantidad recomendada por el médico.        Comida y bebida                    •Shanon suficiente líquido transparente radha para mantener la orina de color amarillo pálido. Si le indicaron que shanon emilio SRO, termine sandra la solución y luego empiece a beber lentamente otros líquidos transparentes. [place] líquidos, por ejemplo:  •Agua. No shanon solamente agua. [place] puede provocar hiponatremia, que es tener escasez de sal (sodio) en el organismo.      •Agua de trocitos de hielo que usted succiona.      •Jugo de frutas con agregado de agua (jugo de frutas diluido).      •Bebidas deportivas de bajas calorías.        •Consuma los alimentos que contienen un equilibrio saludable de electrolitos, radha las bananas, las naranjas, las abdulaziz, los tomates y la espinaca.      •No shanon alcohol.    •Evite lo siguiente:  •Bebidas que contengan gran cantidad de azúcar. Entre estas se incluyen las bebidas deportivas ricas en calorías, el jugo de frutas sin diluir y los refrescos o gaseosas.      •Cafeína.      •Los alimentos con alto contenido de grasa o azúcar.        Instrucciones generales    •Use los medicamentos de venta josé antonio y los recetados solamente radha se lo haya indicado el médico.      •No tome comprimidos de sodio. [place] puede causar la acumulación excesiva de sodio en el organismo (hipernatremia).      •Retome duane actividades normales radha se lo haya indicado el médico. Pregúntele al médico qué actividades son seguras para usted.      •Concurra a todas las visitas de seguimiento radha se lo haya indicado el médico. [place] es importante.        Comuníquese con un médico si:  •Tiene calambres musculares, dolor o molestias, por ejemplo:  •Dolor en el abdomen y el dolor empeora o se mantiene en un área (localizado).      •Rigidez en el vern.        •Tiene emilio erupción cutánea.      •Se siente más irritable de lo habitual.      •Está más somnoliento o tiene más dificultad para despertarse de lo habitual.      •Se siente débil o mareado.      •Tiene mucha sed.        Solicite ayuda inmediatamente si tiene:    •Algún síntoma de deshidratación grave.    •Síntomas de vómitos, por ejemplo:  •No puede comer ni beber sin vomitar.      •Los vómitos empeoran o no desaparecen.      •El vómito tiene dago o emilio sustancia pieter (bilis).        •Síntomas que empeoran con el tratamiento.      •Fiebre.      •Dolor de rowan intenso.    •Problemas con la micción o las deposiciones, por ejemplo:  •Diarrea que empeora o que no desaparece.      •Dago en la materia fecal (heces). [place] puede hacer que la materia fecal sea jon y de aspecto alquitranado.      •No orina u orina solamente emilio pequeña cantidad de color muy oscuro en el término de 6 a 8 horas.        •Dificultad para respirar.      Estos síntomas pueden representar un problema grave que constituye emilio emergencia. No espere a jovita si los síntomas desaparecen. Solicite atención médica de inmediato. Comuníquese con el servicio de emergencias de coyle localidad (911 en los [place]). No conduzca por duane propios medios hasta el hospital.       Resumen    •La deshidratación es emilio afección que se caracteriza por emilio cantidad insuficiente de agua u otros líquidos en el organismo. [place] sucede cuando emilio persona pierde más líquidos de los que consume.      •El tratamiento de esta afección depende de coyle gravedad. El tratamiento se debe comenzar de inmediato. No espere hasta que la deshidratación sea grave.      •Shanon suficiente líquido transparente radha para mantener la orina de color amarillo pálido. Si le indicaron que shanon emilio solución de rehidratación oral (SRO), termine sandra la solución y luego empiece a beber lentamente otros líquidos transparentes.      •Use los medicamentos de venta josé antonio y los recetados solamente radha se lo haya indicado el médico.      •Obtenga ayuda de inmediato si tiene algún síntoma de deshidratación grave.      Esta información no tiene radha fin reemplazar el consejo del médico. Asegúrese de hacerle al médico cualquier pregunta que tenga.      Document Revised: 09/03/2020 Document Reviewed: 09/03/2020    Elsevier Patient Education © 2021 [employer]      SIGUE A TU MÉDICO EN 1-2 DÍAS. REGRESE A LA ER PARA CUALQUIER SÍNTOMA PENDIENTE O NUEVAS PREOCUPACIONES.

## 2021-04-06 NOTE — ED STATDOCS - OBJECTIVE STATEMENT
54 y/o female with a PMHx of HTN, pre-DM, presents to the ED c/o diarrhea and dizziness x 2 days. +associated nausea and abdominal discomfort. States she had multiple episodes of watery diarrhea yesterday and had one episode of watery diarrhea today. States diarrhea is non bloody. Reports decrease PO intake and she feels dehydrated. Denies vomiting. No prior hx of abdominal surgery. No recent travel.

## 2021-04-06 NOTE — ED STATDOCS - PATIENT PORTAL LINK FT
You can access the FollowMyHealth Patient Portal offered by [facility] by registering at the following website: [email or web address]. By joining [employer]’s FollowMyHealth portal, you will also be able to view your health information using other applications (apps) compatible with our system.

## 2021-04-06 NOTE — ED STATDOCS - ATTENDING CONTRIBUTION TO CARE
I, Janice Suresh MD,  performed the initial face to face bedside interview with this patient regarding history of present illness, review of symptoms and relevant past medical, social and family history.  I completed an independent physical examination.  I was the initial provider who evaluated this patient. I have signed out the follow up of any pending tests (i.e. labs, radiological studies) to the ACP.  I have communicated the patient’s plan of care and disposition with the ACP.  The history, relevant review of systems, past medical and surgical history, medical decision making, and physical examination was documented by the scribe in my presence and I attest to the accuracy of the documentation.

## 2021-04-06 NOTE — ED STATDOCS - ENMT, MLM
Airway patent, +dry mucous membranes Throat has no vesicles, no oropharyngeal exudates and uvula is midline.

## 2021-04-06 NOTE — ED STATDOCS - PROGRESS NOTE DETAILS
signed Ella Gunter PA-C Pt seen initially in intake by Dr. Suresh   55F c/o watery nonbloody diarrhea x 2 days. Pt only went once today but approx 15 episodes yesterday. +nausea, no vomiting. pt feels dizzy and dehydrated now. Pt alert, NAD. Plan labs, fluids, re-eval. Pt agrees with plan of  care. signed Ella Gunter PA-C  ID [id number]  Labs with elevated H/H, likely due to dehydration. Slightly elevated LFT and slightly low phosphorus. pt feeling much better after fluids, symptoms resolved. Outpt f/u PMD. return precautions given. Pt feeling well at DC, agrees with DC and plan of care.

## 2021-04-07 LAB
CULTURE RESULTS: SIGNIFICANT CHANGE UP
SPECIMEN SOURCE: SIGNIFICANT CHANGE UP

## 2021-04-20 ENCOUNTER — EMERGENCY (EMERGENCY)
Facility: HOSPITAL | Age: 56
LOS: 0 days | Discharge: HOME | End: 2021-04-20
Attending: EMERGENCY MEDICINE
Payer: MEDICAID

## 2021-04-20 VITALS
TEMPERATURE: 99 F | OXYGEN SATURATION: 97 % | DIASTOLIC BLOOD PRESSURE: 95 MMHG | RESPIRATION RATE: 18 BRPM | SYSTOLIC BLOOD PRESSURE: 140 MMHG | HEART RATE: 70 BPM

## 2021-04-20 VITALS — HEIGHT: 60 IN | WEIGHT: 175.05 LBS

## 2021-04-20 DIAGNOSIS — B69.0 CYSTICERCOSIS OF CENTRAL NERVOUS SYSTEM: ICD-10-CM

## 2021-04-20 DIAGNOSIS — I10 ESSENTIAL (PRIMARY) HYPERTENSION: ICD-10-CM

## 2021-04-20 DIAGNOSIS — E11.9 TYPE 2 DIABETES MELLITUS WITHOUT COMPLICATIONS: ICD-10-CM

## 2021-04-20 DIAGNOSIS — R42 DIZZINESS AND GIDDINESS: ICD-10-CM

## 2021-04-20 LAB
ALBUMIN SERPL ELPH-MCNC: 4.1 G/DL — SIGNIFICANT CHANGE UP (ref 3.3–5)
ALP SERPL-CCNC: 100 U/L — SIGNIFICANT CHANGE UP (ref 40–120)
ALT FLD-CCNC: 57 U/L — SIGNIFICANT CHANGE UP (ref 12–78)
ANION GAP SERPL CALC-SCNC: 4 MMOL/L — LOW (ref 5–17)
APPEARANCE UR: CLEAR — SIGNIFICANT CHANGE UP
APTT BLD: 30.9 SEC — SIGNIFICANT CHANGE UP (ref 27.5–35.5)
AST SERPL-CCNC: 41 U/L — HIGH (ref 15–37)
BASOPHILS # BLD AUTO: 0.05 K/UL — SIGNIFICANT CHANGE UP (ref 0–0.2)
BASOPHILS NFR BLD AUTO: 0.5 % — SIGNIFICANT CHANGE UP (ref 0–2)
BILIRUB SERPL-MCNC: 0.4 MG/DL — SIGNIFICANT CHANGE UP (ref 0.2–1.2)
BILIRUB UR-MCNC: NEGATIVE — SIGNIFICANT CHANGE UP
BUN SERPL-MCNC: 10 MG/DL — SIGNIFICANT CHANGE UP (ref 7–23)
CALCIUM SERPL-MCNC: 9.4 MG/DL — SIGNIFICANT CHANGE UP (ref 8.5–10.1)
CHLORIDE SERPL-SCNC: 107 MMOL/L — SIGNIFICANT CHANGE UP (ref 96–108)
CO2 SERPL-SCNC: 30 MMOL/L — SIGNIFICANT CHANGE UP (ref 22–31)
COLOR SPEC: YELLOW — SIGNIFICANT CHANGE UP
CREAT SERPL-MCNC: 0.91 MG/DL — SIGNIFICANT CHANGE UP (ref 0.5–1.3)
DIFF PNL FLD: NEGATIVE — SIGNIFICANT CHANGE UP
EOSINOPHIL # BLD AUTO: 0.13 K/UL — SIGNIFICANT CHANGE UP (ref 0–0.5)
EOSINOPHIL NFR BLD AUTO: 1.3 % — SIGNIFICANT CHANGE UP (ref 0–6)
GLUCOSE SERPL-MCNC: 131 MG/DL — HIGH (ref 70–99)
GLUCOSE UR QL: NEGATIVE MG/DL — SIGNIFICANT CHANGE UP
HCT VFR BLD CALC: 44 % — SIGNIFICANT CHANGE UP (ref 34.5–45)
HGB BLD-MCNC: 14.5 G/DL — SIGNIFICANT CHANGE UP (ref 11.5–15.5)
IMM GRANULOCYTES NFR BLD AUTO: 0.2 % — SIGNIFICANT CHANGE UP (ref 0–1.5)
INR BLD: 1.09 RATIO — SIGNIFICANT CHANGE UP (ref 0.88–1.16)
KETONES UR-MCNC: NEGATIVE — SIGNIFICANT CHANGE UP
LEUKOCYTE ESTERASE UR-ACNC: NEGATIVE — SIGNIFICANT CHANGE UP
LIDOCAIN IGE QN: 131 U/L — SIGNIFICANT CHANGE UP (ref 73–393)
LYMPHOCYTES # BLD AUTO: 2.21 K/UL — SIGNIFICANT CHANGE UP (ref 1–3.3)
LYMPHOCYTES # BLD AUTO: 22.1 % — SIGNIFICANT CHANGE UP (ref 13–44)
MAGNESIUM SERPL-MCNC: 2.5 MG/DL — SIGNIFICANT CHANGE UP (ref 1.6–2.6)
MCHC RBC-ENTMCNC: 30 PG — SIGNIFICANT CHANGE UP (ref 27–34)
MCHC RBC-ENTMCNC: 33 GM/DL — SIGNIFICANT CHANGE UP (ref 32–36)
MCV RBC AUTO: 90.9 FL — SIGNIFICANT CHANGE UP (ref 80–100)
MONOCYTES # BLD AUTO: 0.54 K/UL — SIGNIFICANT CHANGE UP (ref 0–0.9)
MONOCYTES NFR BLD AUTO: 5.4 % — SIGNIFICANT CHANGE UP (ref 2–14)
NEUTROPHILS # BLD AUTO: 7.04 K/UL — SIGNIFICANT CHANGE UP (ref 1.8–7.4)
NEUTROPHILS NFR BLD AUTO: 70.5 % — SIGNIFICANT CHANGE UP (ref 43–77)
NITRITE UR-MCNC: NEGATIVE — SIGNIFICANT CHANGE UP
PH UR: 7 — SIGNIFICANT CHANGE UP (ref 5–8)
PLATELET # BLD AUTO: 265 K/UL — SIGNIFICANT CHANGE UP (ref 150–400)
POTASSIUM SERPL-MCNC: 3.9 MMOL/L — SIGNIFICANT CHANGE UP (ref 3.5–5.3)
POTASSIUM SERPL-SCNC: 3.9 MMOL/L — SIGNIFICANT CHANGE UP (ref 3.5–5.3)
PROT SERPL-MCNC: 8.4 GM/DL — HIGH (ref 6–8.3)
PROT UR-MCNC: NEGATIVE MG/DL — SIGNIFICANT CHANGE UP
PROTHROM AB SERPL-ACNC: 12.6 SEC — SIGNIFICANT CHANGE UP (ref 10.6–13.6)
RBC # BLD: 4.84 M/UL — SIGNIFICANT CHANGE UP (ref 3.8–5.2)
RBC # FLD: 12.8 % — SIGNIFICANT CHANGE UP (ref 10.3–14.5)
SODIUM SERPL-SCNC: 141 MMOL/L — SIGNIFICANT CHANGE UP (ref 135–145)
SP GR SPEC: 1 — LOW (ref 1.01–1.02)
TROPONIN I SERPL-MCNC: <0.015 NG/ML — SIGNIFICANT CHANGE UP (ref 0.01–0.04)
UROBILINOGEN FLD QL: NEGATIVE MG/DL — SIGNIFICANT CHANGE UP
WBC # BLD: 9.99 K/UL — SIGNIFICANT CHANGE UP (ref 3.8–10.5)
WBC # FLD AUTO: 9.99 K/UL — SIGNIFICANT CHANGE UP (ref 3.8–10.5)

## 2021-04-20 PROCEDURE — 93010 ELECTROCARDIOGRAM REPORT: CPT

## 2021-04-20 PROCEDURE — 83690 ASSAY OF LIPASE: CPT

## 2021-04-20 PROCEDURE — 99285 EMERGENCY DEPT VISIT HI MDM: CPT

## 2021-04-20 PROCEDURE — 70450 CT HEAD/BRAIN W/O DYE: CPT | Mod: 26

## 2021-04-20 PROCEDURE — 87086 URINE CULTURE/COLONY COUNT: CPT

## 2021-04-20 PROCEDURE — 84484 ASSAY OF TROPONIN QUANT: CPT

## 2021-04-20 PROCEDURE — 83735 ASSAY OF MAGNESIUM: CPT

## 2021-04-20 PROCEDURE — 85025 COMPLETE CBC W/AUTO DIFF WBC: CPT

## 2021-04-20 PROCEDURE — 85610 PROTHROMBIN TIME: CPT

## 2021-04-20 PROCEDURE — 70450 CT HEAD/BRAIN W/O DYE: CPT

## 2021-04-20 PROCEDURE — 36415 COLL VENOUS BLD VENIPUNCTURE: CPT

## 2021-04-20 PROCEDURE — 80053 COMPREHEN METABOLIC PANEL: CPT

## 2021-04-20 PROCEDURE — 99284 EMERGENCY DEPT VISIT MOD MDM: CPT | Mod: 25

## 2021-04-20 PROCEDURE — 85730 THROMBOPLASTIN TIME PARTIAL: CPT

## 2021-04-20 PROCEDURE — 81003 URINALYSIS AUTO W/O SCOPE: CPT

## 2021-04-20 PROCEDURE — 93005 ELECTROCARDIOGRAM TRACING: CPT

## 2021-04-20 RX ORDER — MECLIZINE HCL 12.5 MG
1 TABLET ORAL
Qty: 10 | Refills: 0
Start: 2021-04-20 | End: 2021-04-24

## 2021-04-20 RX ORDER — DIAZEPAM 5 MG
5 TABLET ORAL ONCE
Refills: 0 | Status: DISCONTINUED | OUTPATIENT
Start: 2021-04-20 | End: 2021-04-20

## 2021-04-20 RX ORDER — SODIUM CHLORIDE 9 MG/ML
1000 INJECTION INTRAMUSCULAR; INTRAVENOUS; SUBCUTANEOUS ONCE
Refills: 0 | Status: COMPLETED | OUTPATIENT
Start: 2021-04-20 | End: 2021-04-20

## 2021-04-20 RX ADMIN — SODIUM CHLORIDE 2000 MILLILITER(S): 9 INJECTION INTRAMUSCULAR; INTRAVENOUS; SUBCUTANEOUS at 12:45

## 2021-04-20 RX ADMIN — Medication 5 MILLIGRAM(S): at 13:50

## 2021-04-20 NOTE — ED STATDOCS - PROGRESS NOTE DETAILS
ID # [id number] . pt aware of ct results and labs and will fu with neurologist. pt agrees with plan and states she feels better with ivfs and meds given in ED. -Venice Matos PA-C

## 2021-04-20 NOTE — ED STATDOCS - NSFOLLOWUPINSTRUCTIONS_ED_ALL_ED_FT
Follow up with neurologist as scheduled    Increase fluids and hydration Follow up with neurologist as scheduled    Increase fluids and hydration  Pt has kilo made from ED on 4/23/21 with Neurology team Dr. Eastman

## 2021-04-20 NOTE — ED ADULT TRIAGE NOTE - CHIEF COMPLAINT QUOTE
Patient comes to ED for dizziness since yesterday. Patient denies any other symptoms. Patient  evaluated for Stroke no code stroke at this time. patient has had similar symptoms in past

## 2021-04-20 NOTE — ED STATDOCS - OBJECTIVE STATEMENT
54 y/o female with a PMHx of DM, presents to the ED c/o dizziness since yesterday at approx 1600. Dizziness exacerbates with bending over. Denies N/V/D. No focal weakness. No sick contacts. PMD: Niko hopper.    #: [id number]

## 2021-04-20 NOTE — ED STATDOCS - PATIENT PORTAL LINK FT
You can access the FollowMyHealth Patient Portal offered by [facility] by registering at the following website: [email or web address]. By joining [employer]’s FollowMyHealth portal, you will also be able to view your health information using other applications (apps) compatible with our system.

## 2021-04-20 NOTE — ED ADULT NURSE NOTE - OBJECTIVE STATEMENT
Pt comes to the ED complaining of dizziness since yesterday. Pt states that she has had some diarrhea lately and that she has had a loss of appetite as well. Pt states that she hasn't wanted much to eat or drink in the past two days.

## 2021-04-20 NOTE — ED STATDOCS - CARE PLAN
Principal Discharge DX:	Neurocysticercosis   Principal Discharge DX:	Dizziness  Secondary Diagnosis:	Neurocysticercosis

## 2021-04-21 LAB
CULTURE RESULTS: SIGNIFICANT CHANGE UP
SPECIMEN SOURCE: SIGNIFICANT CHANGE UP

## 2021-04-30 ENCOUNTER — APPOINTMENT (OUTPATIENT)
Dept: NEUROLOGY | Facility: CLINIC | Age: 56
End: 2021-04-30
Payer: MEDICAID

## 2021-04-30 VITALS
BODY MASS INDEX: 32.2 KG/M2 | HEIGHT: 62 IN | DIASTOLIC BLOOD PRESSURE: 86 MMHG | SYSTOLIC BLOOD PRESSURE: 128 MMHG | HEART RATE: 90 BPM | WEIGHT: 175 LBS | TEMPERATURE: 97.8 F

## 2021-04-30 DIAGNOSIS — M17.11 UNILATERAL PRIMARY OSTEOARTHRITIS, RIGHT KNEE: ICD-10-CM

## 2021-04-30 DIAGNOSIS — Z87.898 PERSONAL HISTORY OF OTHER SPECIFIED CONDITIONS: ICD-10-CM

## 2021-04-30 DIAGNOSIS — R93.0 ABNORMAL FINDINGS ON DIAGNOSTIC IMAGING OF SKULL AND HEAD, NOT ELSEWHERE CLASSIFIED: ICD-10-CM

## 2021-04-30 DIAGNOSIS — Z78.9 OTHER SPECIFIED HEALTH STATUS: ICD-10-CM

## 2021-04-30 DIAGNOSIS — R73.03 PREDIABETES.: ICD-10-CM

## 2021-04-30 DIAGNOSIS — R25.1 TREMOR, UNSPECIFIED: ICD-10-CM

## 2021-04-30 DIAGNOSIS — Z83.79 FAMILY HISTORY OF OTHER DISEASES OF THE DIGESTIVE SYSTEM: ICD-10-CM

## 2021-04-30 DIAGNOSIS — R42 DIZZINESS AND GIDDINESS: ICD-10-CM

## 2021-04-30 PROBLEM — E11.9 TYPE 2 DIABETES MELLITUS WITHOUT COMPLICATIONS: Chronic | Status: ACTIVE | Noted: 2021-04-22

## 2021-04-30 PROCEDURE — 99204 OFFICE O/P NEW MOD 45 MIN: CPT

## 2021-04-30 PROCEDURE — 99072 ADDL SUPL MATRL&STAF TM PHE: CPT

## 2021-04-30 PROCEDURE — T1013: CPT

## 2021-04-30 NOTE — DISCUSSION/SUMMARY
[FreeTextEntry1] : 55-year-old H female with PMHx of right knee OA, prediabetes, one prior episode of dizziness/lightheadedness in 2015 when she lived in [place], experienced an episode of dizziness/lightheadedness on 4/20/21, resolved within few hours, it was not associated LOC visual blurring or double vision, was seen in  ER, CT scan head revealed scattered calcifications/chronic granulomatous, neurocysticercosis could not be excluded.\par \par # Dizziness/ lightheadedness - possible positional vertigo\par \par # CT scan head raises possibility of granulomatous disease/? neurocysticercosis\par \par - I have recommended MRI of the head with and without contrast\par - Obtain routine EEG\par - As the patient is asymptomatic, no medication/theray is deemed necessary\par - Followup with me in 6 weeks

## 2021-04-30 NOTE — REVIEW OF SYSTEMS
[As Noted in HPI] : as noted in HPI [Dizziness] : dizziness [Negative] : Heme/Lymph [Lightheadedness] : lightheadedness [Joint Pain] : joint pain

## 2021-04-30 NOTE — PHYSICAL EXAM
[General Appearance - Alert] : alert [General Appearance - In No Acute Distress] : in no acute distress [Oriented To Time, Place, And Person] : oriented to person, place, and time [Impaired Insight] : insight and judgment were intact [Affect] : the affect was normal [Person] : oriented to person [Place] : oriented to place [Time] : oriented to time [Concentration Intact] : normal concentrating ability [Naming Objects] : no difficulty naming common objects [Repeating Phrases] : no difficulty repeating a phrase [Fluency] : fluency intact [Comprehension] : comprehension intact [Past History] : adequate knowledge of personal past history [Cranial Nerves Optic (II)] : visual acuity intact bilaterally,  visual fields full to confrontation, pupils equal round and reactive to light [Cranial Nerves Oculomotor (III)] : extraocular motion intact [Cranial Nerves Trigeminal (V)] : facial sensation intact symmetrically [Cranial Nerves Facial (VII)] : face symmetrical [Cranial Nerves Vestibulocochlear (VIII)] : hearing was intact bilaterally [Cranial Nerves Glossopharyngeal (IX)] : tongue and palate midline [Cranial Nerves Accessory (XI - Cranial And Spinal)] : head turning and shoulder shrug symmetric [Cranial Nerves Hypoglossal (XII)] : there was no tongue deviation with protrusion [Motor Tone] : muscle tone was normal in all four extremities [Motor Strength] : muscle strength was normal in all four extremities [No Muscle Atrophy] : normal bulk in all four extremities [Sensation Tactile Decrease] : light touch was intact [Abnormal Walk] : normal gait [Balance] : balance was intact [2+] : Brachioradialis left 2+ [PERRL With Normal Accommodation] : pupils were equal in size, round, reactive to light, with normal accommodation [Extraocular Movements] : extraocular movements were intact [Full Visual Field] : full visual field [Hearing Threshold Finger Rub Not [place]] : hearing was normal [Auscultation Breath Sounds / Voice Sounds] : lungs were clear to auscultation bilaterally [Heart Sounds] : normal S1 and S2 [Arterial Pulses Carotid] : carotid pulses were normal with no bruits [Edema] : there was no peripheral edema [] : no rash [Registration Intact] : recent registration memory intact [Tremor] : a tremor present [0] : Patella right 0 [1+] : Patella left 1+ [Past-pointing] : there was no past-pointing [Plantar Reflex Right Only] : normal on the right [Plantar Reflex Left Only] : normal on the left [FreeTextEntry1] : PAINFUL RIGHT KNEE MOVEMENTS

## 2021-04-30 NOTE — REASON FOR VISIT
[Post ER] : a post ER visit [[employer] ] : provided by [employer]   [Time Spent: ____ minutes] : I have spent [unfilled] minutes of time on the encounter. The patient's primary language is not English thus required  services. [FreeTextEntry1] : [id number] [FreeTextEntry2] : Megha

## 2021-04-30 NOTE — HISTORY OF PRESENT ILLNESS
[FreeTextEntry1] : 55-year-old  female with PMHx of right knee OA, prediabetes, presents today for evalaution of an episode of dizziness and lightheadedness on 4/20/21. Patient reports that she experienced severe dizziness and lightheadedness, almost blacked out but did not lose consciousness, she denied associated double vision, difficulty fixating eyes or HA, Nausea or vomiting, she reports the dizziness was intense, she went to [facility] ER on 4/20/21, CT scan head revealed scattered calcifications/chronic granulomatous, neurocysticercosis could not be excluded, otherwise CT head was unremarkable, Her symptoms improved within a few hours, she was discharged home with the advice to follow up with neurology.\par \par Upon further history, she reports that she has had one prior episode of dizziness and lightheadedness in 2015 when she lived in [place], she states that she had difficulty walking and was dizzy  however did not pass out, her symptoms lasted 2 days. She did not follow up with any physicians has no medical assistance was available.\par \par Pt denies history of prior seizures or seizure-like episodes, denies LOC, denies headaches, visual blurring, double vision, tinnitus, ataxia or sensory motor symptoms. Patient does not recall having had any major illnesses during her younger age, she lived in [place], in a remote area where no medical assistance was available

## 2021-04-30 NOTE — DATA REVIEWED
[de-identified] : 4/20/21: CT head: Scattered calcifications which may represent chronic granulomatous. Neurocysticercosis cannot be totally excluded. incidental partial empty sella, otherwise unremarkable CT brain.

## 2021-05-29 ENCOUNTER — APPOINTMENT (OUTPATIENT)
Dept: MRI IMAGING | Facility: CLINIC | Age: 56
End: 2021-05-29
Payer: MEDICAID

## 2021-05-29 ENCOUNTER — OUTPATIENT (OUTPATIENT)
Dept: OUTPATIENT SERVICES | Facility: HOSPITAL | Age: 56
LOS: 1 days | End: 2021-05-29
Payer: MEDICAID

## 2021-05-29 DIAGNOSIS — R93.0 ABNORMAL FINDINGS ON DIAGNOSTIC IMAGING OF SKULL AND HEAD, NOT ELSEWHERE CLASSIFIED: ICD-10-CM

## 2021-05-29 DIAGNOSIS — R42 DIZZINESS AND GIDDINESS: ICD-10-CM

## 2021-05-29 PROCEDURE — A9585: CPT

## 2021-05-29 PROCEDURE — 70553 MRI BRAIN STEM W/O & W/DYE: CPT

## 2021-05-29 PROCEDURE — 70553 MRI BRAIN STEM W/O & W/DYE: CPT | Mod: 26

## 2021-06-15 ENCOUNTER — APPOINTMENT (OUTPATIENT)
Dept: NEUROLOGY | Facility: CLINIC | Age: 56
End: 2021-06-15

## 2021-12-29 ENCOUNTER — APPOINTMENT (OUTPATIENT)
Dept: NEUROLOGY | Facility: CLINIC | Age: 56
End: 2021-12-29

## 2022-03-11 ENCOUNTER — EMERGENCY (EMERGENCY)
Facility: HOSPITAL | Age: 57
LOS: 0 days | Discharge: HOME | End: 2022-03-11
Attending: EMERGENCY MEDICINE
Payer: MEDICAID

## 2022-03-11 VITALS
RESPIRATION RATE: 17 BRPM | DIASTOLIC BLOOD PRESSURE: 92 MMHG | SYSTOLIC BLOOD PRESSURE: 160 MMHG | OXYGEN SATURATION: 96 % | WEIGHT: 160.06 LBS | TEMPERATURE: 98 F | HEART RATE: 80 BPM | HEIGHT: 60 IN

## 2022-03-11 VITALS
TEMPERATURE: 98 F | DIASTOLIC BLOOD PRESSURE: 82 MMHG | RESPIRATION RATE: 18 BRPM | HEART RATE: 68 BPM | OXYGEN SATURATION: 99 % | SYSTOLIC BLOOD PRESSURE: 138 MMHG

## 2022-03-11 DIAGNOSIS — E11.9 TYPE 2 DIABETES MELLITUS WITHOUT COMPLICATIONS: ICD-10-CM

## 2022-03-11 DIAGNOSIS — F41.9 ANXIETY DISORDER, UNSPECIFIED: ICD-10-CM

## 2022-03-11 DIAGNOSIS — G47.00 INSOMNIA, UNSPECIFIED: ICD-10-CM

## 2022-03-11 DIAGNOSIS — R42 DIZZINESS AND GIDDINESS: ICD-10-CM

## 2022-03-11 DIAGNOSIS — I10 ESSENTIAL (PRIMARY) HYPERTENSION: ICD-10-CM

## 2022-03-11 LAB
ALBUMIN SERPL ELPH-MCNC: 3.7 G/DL — SIGNIFICANT CHANGE UP (ref 3.3–5)
ALP SERPL-CCNC: 146 U/L — HIGH (ref 40–120)
ALT FLD-CCNC: 47 U/L — SIGNIFICANT CHANGE UP (ref 12–78)
ANION GAP SERPL CALC-SCNC: 5 MMOL/L — SIGNIFICANT CHANGE UP (ref 5–17)
APPEARANCE UR: CLEAR — SIGNIFICANT CHANGE UP
AST SERPL-CCNC: 20 U/L — SIGNIFICANT CHANGE UP (ref 15–37)
BASOPHILS # BLD AUTO: 0.08 K/UL — SIGNIFICANT CHANGE UP (ref 0–0.2)
BASOPHILS NFR BLD AUTO: 0.7 % — SIGNIFICANT CHANGE UP (ref 0–2)
BILIRUB SERPL-MCNC: 0.3 MG/DL — SIGNIFICANT CHANGE UP (ref 0.2–1.2)
BILIRUB UR-MCNC: NEGATIVE — SIGNIFICANT CHANGE UP
BUN SERPL-MCNC: 17 MG/DL — SIGNIFICANT CHANGE UP (ref 7–23)
CALCIUM SERPL-MCNC: 9.2 MG/DL — SIGNIFICANT CHANGE UP (ref 8.5–10.1)
CHLORIDE SERPL-SCNC: 107 MMOL/L — SIGNIFICANT CHANGE UP (ref 96–108)
CO2 SERPL-SCNC: 28 MMOL/L — SIGNIFICANT CHANGE UP (ref 22–31)
COLOR SPEC: YELLOW — SIGNIFICANT CHANGE UP
CREAT SERPL-MCNC: 0.76 MG/DL — SIGNIFICANT CHANGE UP (ref 0.5–1.3)
DIFF PNL FLD: NEGATIVE — SIGNIFICANT CHANGE UP
EGFR: 92 ML/MIN/1.73M2 — SIGNIFICANT CHANGE UP
EOSINOPHIL # BLD AUTO: 1.02 K/UL — HIGH (ref 0–0.5)
EOSINOPHIL NFR BLD AUTO: 8.9 % — HIGH (ref 0–6)
GLUCOSE SERPL-MCNC: 126 MG/DL — HIGH (ref 70–99)
GLUCOSE UR QL: NEGATIVE — SIGNIFICANT CHANGE UP
HCT VFR BLD CALC: 43.8 % — SIGNIFICANT CHANGE UP (ref 34.5–45)
HGB BLD-MCNC: 14.5 G/DL — SIGNIFICANT CHANGE UP (ref 11.5–15.5)
IMM GRANULOCYTES NFR BLD AUTO: 0.2 % — SIGNIFICANT CHANGE UP (ref 0–1.5)
KETONES UR-MCNC: NEGATIVE — SIGNIFICANT CHANGE UP
LEUKOCYTE ESTERASE UR-ACNC: NEGATIVE — SIGNIFICANT CHANGE UP
LYMPHOCYTES # BLD AUTO: 2.88 K/UL — SIGNIFICANT CHANGE UP (ref 1–3.3)
LYMPHOCYTES # BLD AUTO: 25.1 % — SIGNIFICANT CHANGE UP (ref 13–44)
MCHC RBC-ENTMCNC: 30.1 PG — SIGNIFICANT CHANGE UP (ref 27–34)
MCHC RBC-ENTMCNC: 33.1 GM/DL — SIGNIFICANT CHANGE UP (ref 32–36)
MCV RBC AUTO: 90.9 FL — SIGNIFICANT CHANGE UP (ref 80–100)
MONOCYTES # BLD AUTO: 0.82 K/UL — SIGNIFICANT CHANGE UP (ref 0–0.9)
MONOCYTES NFR BLD AUTO: 7.1 % — SIGNIFICANT CHANGE UP (ref 2–14)
NEUTROPHILS # BLD AUTO: 6.65 K/UL — SIGNIFICANT CHANGE UP (ref 1.8–7.4)
NEUTROPHILS NFR BLD AUTO: 58 % — SIGNIFICANT CHANGE UP (ref 43–77)
NITRITE UR-MCNC: NEGATIVE — SIGNIFICANT CHANGE UP
PH UR: 7 — SIGNIFICANT CHANGE UP (ref 5–8)
PLATELET # BLD AUTO: 223 K/UL — SIGNIFICANT CHANGE UP (ref 150–400)
POTASSIUM SERPL-MCNC: 3.9 MMOL/L — SIGNIFICANT CHANGE UP (ref 3.5–5.3)
POTASSIUM SERPL-SCNC: 3.9 MMOL/L — SIGNIFICANT CHANGE UP (ref 3.5–5.3)
PROT SERPL-MCNC: 8 GM/DL — SIGNIFICANT CHANGE UP (ref 6–8.3)
PROT UR-MCNC: NEGATIVE — SIGNIFICANT CHANGE UP
RBC # BLD: 4.82 M/UL — SIGNIFICANT CHANGE UP (ref 3.8–5.2)
RBC # FLD: 12.8 % — SIGNIFICANT CHANGE UP (ref 10.3–14.5)
SODIUM SERPL-SCNC: 140 MMOL/L — SIGNIFICANT CHANGE UP (ref 135–145)
SP GR SPEC: 1.01 — SIGNIFICANT CHANGE UP (ref 1.01–1.02)
TSH SERPL-MCNC: 3.64 UU/ML — SIGNIFICANT CHANGE UP (ref 0.34–4.82)
UROBILINOGEN FLD QL: NEGATIVE — SIGNIFICANT CHANGE UP
WBC # BLD: 11.47 K/UL — HIGH (ref 3.8–10.5)
WBC # FLD AUTO: 11.47 K/UL — HIGH (ref 3.8–10.5)

## 2022-03-11 PROCEDURE — 93010 ELECTROCARDIOGRAM REPORT: CPT

## 2022-03-11 PROCEDURE — 70450 CT HEAD/BRAIN W/O DYE: CPT | Mod: 26,MA

## 2022-03-11 PROCEDURE — 36415 COLL VENOUS BLD VENIPUNCTURE: CPT

## 2022-03-11 PROCEDURE — 93005 ELECTROCARDIOGRAM TRACING: CPT

## 2022-03-11 PROCEDURE — 99285 EMERGENCY DEPT VISIT HI MDM: CPT | Mod: 25

## 2022-03-11 PROCEDURE — 80053 COMPREHEN METABOLIC PANEL: CPT

## 2022-03-11 PROCEDURE — 84443 ASSAY THYROID STIM HORMONE: CPT

## 2022-03-11 PROCEDURE — 81003 URINALYSIS AUTO W/O SCOPE: CPT

## 2022-03-11 PROCEDURE — 70450 CT HEAD/BRAIN W/O DYE: CPT | Mod: MA

## 2022-03-11 PROCEDURE — 99285 EMERGENCY DEPT VISIT HI MDM: CPT

## 2022-03-11 PROCEDURE — 85025 COMPLETE CBC W/AUTO DIFF WBC: CPT

## 2022-03-11 RX ORDER — CLONAZEPAM 1 MG
1 TABLET ORAL
Qty: 3 | Refills: 0
Start: 2022-03-11 | End: 2022-03-13

## 2022-03-11 RX ORDER — MECLIZINE HCL 12.5 MG
25 TABLET ORAL ONCE
Refills: 0 | Status: COMPLETED | OUTPATIENT
Start: 2022-03-11 | End: 2022-03-11

## 2022-03-11 RX ORDER — CLONAZEPAM 1 MG
1 TABLET ORAL ONCE
Refills: 0 | Status: DISCONTINUED | OUTPATIENT
Start: 2022-03-11 | End: 2022-03-11

## 2022-03-11 RX ORDER — MECLIZINE HCL 12.5 MG
1 TABLET ORAL
Qty: 15 | Refills: 0
Start: 2022-03-11 | End: 2022-03-15

## 2022-03-11 RX ADMIN — Medication 25 MILLIGRAM(S): at 00:50

## 2022-03-11 RX ADMIN — Medication 1 MILLIGRAM(S): at 00:50

## 2022-03-11 NOTE — ED PROVIDER NOTE - NSICDXFAMILYHX_GEN_ALL_CORE_FT
Patient unable to complete
FAMILY HISTORY:  No pertinent family history in first degree relatives

## 2022-03-11 NOTE — ED PROVIDER NOTE - CLINICAL SUMMARY MEDICAL DECISION MAKING FREE TEXT BOX
Dizziness likely secondary to vertigo.  Patient also anxious and unable to sleep due to the dizziness.  EKG, labs, urine and CT head all negative in ED.  Patient to f/u with neurologist and PMD.

## 2022-03-11 NOTE — ED ADULT NURSE NOTE - NSIMPLEMENTINTERV_GEN_ALL_ED
Implemented All Universal Safety Interventions:  [place] to call system. Call bell, personal items and telephone within reach. Instruct patient to call for assistance. Room bathroom lighting operational. Non-slip footwear when patient is off stretcher. Physically safe environment: no spills, clutter or unnecessary equipment. Stretcher in lowest position, wheels locked, appropriate side rails in place.

## 2022-03-11 NOTE — ED PROVIDER NOTE - OBJECTIVE STATEMENT
Pt. is a 55 yo F presenting with dizziness.  Patient states she has had dizziness on and off for 5 days.  She denies headache, visual disturbance, weakness or numbness.  She denies any injury to head.  She has had insomnia for 5 days and is taking melatonin without any relief.  She admits to feeling anxious and nervous but cannot relax Pt. is a 57 yo F with a hx of DM and HTN presenting with dizziness.  Patient states she has had dizziness on and off for 5 days.  She denies headache, visual disturbance, weakness or numbness.  She denies any injury to head.  She has had insomnia for 5 days and is taking melatonin without any relief.  She admits to feeling anxious and nervous but cannot relax and feels like her heart is racing at times.  She denies any fever, illness, chest pain or trouble breathing.

## 2022-03-11 NOTE — ED PROVIDER NOTE - NSFOLLOWUPINSTRUCTIONS_ED_ALL_ED_FT
meds for dizziness at pharmacy.   See a neurologist for your vertigo.                                                                                                                                              Vértigo    Vertigo      El vértigo es la sensación de que usted o todo lo que lo rodea se mueve cuando en realidad eso no sucede. Esta sensación puede aparecer y desaparecer en cualquier momento. El vértigo suele desaparecer solo. El vértigo puede ser peligroso si ocurre mientras está haciendo algo que podría suponer un riesgo para usted y para los demás, por ejemplo, conduciendo un automóvil u operando maquinaria.    Coyle médico le hará estudios para tratar de determinar la causa del vértigo. Los estudios también ayudarán al médico a decidir cuál es la mejor manera de tratar coyle afección.      Siga estas instrucciones en coyle casa:      Comida y bebida       A comparison of three sample cups showing dark yellow, yellow, and pale yellow urine.       A sign showing that a person should not drink beer, wine, or liquor.     •La deshidratación puede empeorar el vértigo. Shanon suficiente líquido radha para mantener la orina de color amarillo pálido.      • No shanon alcohol.      Actividad     •Retome juanita actividades normales según lo indicado por el médico. Pregúntele al médico qué actividades son seguras para usted.      •Por la mañana, siéntese sandra a un lado de la cama. Cuando se sienta bal, póngase lentamente de pie mientras se sostiene de algo, hasta que sepa que ha logrado el equilibrio.      •Muévase lentamente. Evite algunas posiciones o determinados movimientos repentinos de la rowan y el cuerpo radha se lo haya indicado el médico.      •Si tiene dificultad para caminar o mantener el equilibrio, use un bastón para mantener la estabilidad. Si se siente mareado o inestable, siéntese de inmediato.      •No jose m ninguna tarea que podría ponerlo en riesgo a usted o a otras personas en stacie de tener vértigo.      •Evite agacharse si se siente mareado. En coyle casa, coloque los objetos de modo que le resulte fácil alcanzarlos sin doblarse o agacharse.      • No conduzca vehículos ni opere maquinaria si se siente mareado.      Instrucciones generales     •Use los medicamentos de venta josé antonio y los recetados solamente radha se lo haya indicado el médico.      •Concurra a todas las visitas de seguimiento. [place] es importante.        Comuníquese con un médico si:    •Los medicamentos no le alivian el vértigo o skye empeora.      •Coyle afección empeora o presenta síntomas nuevos.      •Tiene fiebre.      •Siente náuseas o tiene vómitos, o si las náuseas empeoran.      •Juanita familiares o amigos advierten cambios en coyle comportamiento.      •Tiene adormecimiento o sensación de pinchazos y hormigueo en emilio parte del cuerpo.        Busque ayuda de inmediato si:    •Se siente mareado continuamente o se desmaya.      •Presenta diana de rowan intensos.      •Presenta rigidez en el vern.      •Presenta sensibilidad a la jair.      •Tiene dificultad para moverse o hablar.      •Tiene debilidad en las dain, los brazos o las piernas.      •Presenta cambios en la audición o la visión.      Estos síntomas pueden representar un problema grave que constituye emilio emergencia. No espere a jovita si los síntomas desaparecen. Solicite atención médica de inmediato. Comuníquese con el servicio de emergencias de coyle localidad (911 en los [place]). No conduzca por juanita propios medios hasta el hospital.       Resumen    •El vértigo es la sensación de que usted o todo lo que lo rodea se mueve cuando en realidad eso no sucede.      •Coyle médico le hará estudios para tratar de determinar la causa del vértigo.      •Siga las instrucciones de cuidado en el hogar. Juan Jose vez le indiquen que evite ciertas tareas, posiciones o movimientos.      •Comuníquese con un médico si los medicamentos no alivian los síntomas o si tiene fiebre, náuseas, vómitos o cambios en el comportamiento.      •Solicite ayuda de inmediato si tiene diana de rowan intensos o dificultad para hablar, o si experimenta problemas auditivos o de visión.      Esta información no tiene radha fin reemplazar el consejo del médico. Asegúrese de hacerle al médico cualquier pregunta que tenga.      Document Revised: 12/13/2021 Document Reviewed: 12/13/2021    [employer]avi Patient Education © 2022 [employer]

## 2022-03-11 NOTE — ED PROVIDER NOTE - CARE PROVIDER_API CALL
Ann-Marie Mg (MD)  Clinical Neurophysiology; EEGEpilepsy; Neurology; Sleep Medicine  [address], [place] [address]  Phone: [phone]  Fax: [phone]  Follow Up Time:

## 2022-03-11 NOTE — ED PROVIDER NOTE - PATIENT PORTAL LINK FT
You can access the FollowMyHealth Patient Portal offered by [facility] by registering at the following website: [email or web address]. By joining [employer]’s FollowMyHealth portal, you will also be able to view your health information using other applications (apps) compatible with our system.

## 2022-03-11 NOTE — ED ADULT NURSE REASSESSMENT NOTE - NS ED NURSE REASSESS COMMENT FT1
Pt resting comfortably in stretcher. Vital signs stable at this time. IV discontinued. Discharge instructions reviewed. [place]  Juliano, ID# [id number].

## 2022-03-11 NOTE — ED ADULT TRIAGE NOTE - CHIEF COMPLAINT QUOTE
"I feel dizzy because I can't sleep for 5 days. I don't know why I can't sleep." Denies fall or injury, nausea or vomiting. pt took melatonin for sleep with no relief.

## 2023-03-14 ENCOUNTER — OUTPATIENT (OUTPATIENT)
Dept: OUTPATIENT SERVICES | Facility: HOSPITAL | Age: 58
LOS: 1 days | End: 2023-03-14
Payer: MEDICAID

## 2023-03-14 ENCOUNTER — APPOINTMENT (OUTPATIENT)
Dept: MAMMOGRAPHY | Facility: CLINIC | Age: 58
End: 2023-03-14
Payer: MEDICAID

## 2023-03-14 DIAGNOSIS — Z01.419 ENCOUNTER FOR GYNECOLOGICAL EXAMINATION (GENERAL) (ROUTINE) WITHOUT ABNORMAL FINDINGS: ICD-10-CM

## 2023-03-14 PROCEDURE — 77067 SCR MAMMO BI INCL CAD: CPT

## 2023-03-14 PROCEDURE — 77067 SCR MAMMO BI INCL CAD: CPT | Mod: 26

## 2023-03-14 PROCEDURE — 77063 BREAST TOMOSYNTHESIS BI: CPT

## 2023-03-14 PROCEDURE — 77063 BREAST TOMOSYNTHESIS BI: CPT | Mod: 26

## 2023-04-16 ENCOUNTER — EMERGENCY (EMERGENCY)
Facility: HOSPITAL | Age: 58
LOS: 0 days | Discharge: HOME | End: 2023-04-16
Attending: STUDENT IN AN ORGANIZED HEALTH CARE EDUCATION/TRAINING PROGRAM
Payer: MEDICAID

## 2023-04-16 VITALS
TEMPERATURE: 99 F | RESPIRATION RATE: 17 BRPM | DIASTOLIC BLOOD PRESSURE: 95 MMHG | OXYGEN SATURATION: 97 % | HEART RATE: 70 BPM | SYSTOLIC BLOOD PRESSURE: 153 MMHG

## 2023-04-16 VITALS — WEIGHT: 160.06 LBS | HEIGHT: 61 IN

## 2023-04-16 DIAGNOSIS — R11.2 NAUSEA WITH VOMITING, UNSPECIFIED: ICD-10-CM

## 2023-04-16 DIAGNOSIS — R73.03 PREDIABETES: ICD-10-CM

## 2023-04-16 DIAGNOSIS — R42 DIZZINESS AND GIDDINESS: ICD-10-CM

## 2023-04-16 DIAGNOSIS — R29.700 NIHSS SCORE 0: ICD-10-CM

## 2023-04-16 DIAGNOSIS — I10 ESSENTIAL (PRIMARY) HYPERTENSION: ICD-10-CM

## 2023-04-16 LAB
ALBUMIN SERPL ELPH-MCNC: 3.8 G/DL — SIGNIFICANT CHANGE UP (ref 3.3–5)
ALP SERPL-CCNC: 112 U/L — SIGNIFICANT CHANGE UP (ref 40–120)
ALT FLD-CCNC: 53 U/L — SIGNIFICANT CHANGE UP (ref 12–78)
ANION GAP SERPL CALC-SCNC: 3 MMOL/L — LOW (ref 5–17)
AST SERPL-CCNC: 23 U/L — SIGNIFICANT CHANGE UP (ref 15–37)
BASOPHILS # BLD AUTO: 0.05 K/UL — SIGNIFICANT CHANGE UP (ref 0–0.2)
BASOPHILS NFR BLD AUTO: 0.4 % — SIGNIFICANT CHANGE UP (ref 0–2)
BILIRUB SERPL-MCNC: 0.3 MG/DL — SIGNIFICANT CHANGE UP (ref 0.2–1.2)
BUN SERPL-MCNC: 22 MG/DL — SIGNIFICANT CHANGE UP (ref 7–23)
CALCIUM SERPL-MCNC: 9.4 MG/DL — SIGNIFICANT CHANGE UP (ref 8.5–10.1)
CHLORIDE SERPL-SCNC: 109 MMOL/L — HIGH (ref 96–108)
CO2 SERPL-SCNC: 29 MMOL/L — SIGNIFICANT CHANGE UP (ref 22–31)
CREAT SERPL-MCNC: 0.66 MG/DL — SIGNIFICANT CHANGE UP (ref 0.5–1.3)
EGFR: 102 ML/MIN/1.73M2 — SIGNIFICANT CHANGE UP
EOSINOPHIL # BLD AUTO: 0.1 K/UL — SIGNIFICANT CHANGE UP (ref 0–0.5)
EOSINOPHIL NFR BLD AUTO: 0.8 % — SIGNIFICANT CHANGE UP (ref 0–6)
GLUCOSE SERPL-MCNC: 152 MG/DL — HIGH (ref 70–99)
HCT VFR BLD CALC: 42.9 % — SIGNIFICANT CHANGE UP (ref 34.5–45)
HGB BLD-MCNC: 14.4 G/DL — SIGNIFICANT CHANGE UP (ref 11.5–15.5)
IMM GRANULOCYTES NFR BLD AUTO: 0.6 % — SIGNIFICANT CHANGE UP (ref 0–0.9)
LYMPHOCYTES # BLD AUTO: 2.53 K/UL — SIGNIFICANT CHANGE UP (ref 1–3.3)
LYMPHOCYTES # BLD AUTO: 20 % — SIGNIFICANT CHANGE UP (ref 13–44)
MAGNESIUM SERPL-MCNC: 2.4 MG/DL — SIGNIFICANT CHANGE UP (ref 1.6–2.6)
MCHC RBC-ENTMCNC: 31.5 PG — SIGNIFICANT CHANGE UP (ref 27–34)
MCHC RBC-ENTMCNC: 33.6 GM/DL — SIGNIFICANT CHANGE UP (ref 32–36)
MCV RBC AUTO: 93.9 FL — SIGNIFICANT CHANGE UP (ref 80–100)
MONOCYTES # BLD AUTO: 0.87 K/UL — SIGNIFICANT CHANGE UP (ref 0–0.9)
MONOCYTES NFR BLD AUTO: 6.9 % — SIGNIFICANT CHANGE UP (ref 2–14)
NEUTROPHILS # BLD AUTO: 9.05 K/UL — HIGH (ref 1.8–7.4)
NEUTROPHILS NFR BLD AUTO: 71.3 % — SIGNIFICANT CHANGE UP (ref 43–77)
PLATELET # BLD AUTO: 230 K/UL — SIGNIFICANT CHANGE UP (ref 150–400)
POTASSIUM SERPL-MCNC: 3.7 MMOL/L — SIGNIFICANT CHANGE UP (ref 3.5–5.3)
POTASSIUM SERPL-SCNC: 3.7 MMOL/L — SIGNIFICANT CHANGE UP (ref 3.5–5.3)
PROT SERPL-MCNC: 8.1 GM/DL — SIGNIFICANT CHANGE UP (ref 6–8.3)
RBC # BLD: 4.57 M/UL — SIGNIFICANT CHANGE UP (ref 3.8–5.2)
RBC # FLD: 12.3 % — SIGNIFICANT CHANGE UP (ref 10.3–14.5)
SODIUM SERPL-SCNC: 141 MMOL/L — SIGNIFICANT CHANGE UP (ref 135–145)
TROPONIN I, HIGH SENSITIVITY RESULT: 6.02 NG/L — SIGNIFICANT CHANGE UP
WBC # BLD: 12.67 K/UL — HIGH (ref 3.8–10.5)
WBC # FLD AUTO: 12.67 K/UL — HIGH (ref 3.8–10.5)

## 2023-04-16 PROCEDURE — 93005 ELECTROCARDIOGRAM TRACING: CPT

## 2023-04-16 PROCEDURE — 99285 EMERGENCY DEPT VISIT HI MDM: CPT

## 2023-04-16 PROCEDURE — 99285 EMERGENCY DEPT VISIT HI MDM: CPT | Mod: 25

## 2023-04-16 PROCEDURE — 80053 COMPREHEN METABOLIC PANEL: CPT

## 2023-04-16 PROCEDURE — 83735 ASSAY OF MAGNESIUM: CPT

## 2023-04-16 PROCEDURE — 96374 THER/PROPH/DIAG INJ IV PUSH: CPT

## 2023-04-16 PROCEDURE — 93010 ELECTROCARDIOGRAM REPORT: CPT

## 2023-04-16 PROCEDURE — 84484 ASSAY OF TROPONIN QUANT: CPT

## 2023-04-16 PROCEDURE — 71046 X-RAY EXAM CHEST 2 VIEWS: CPT | Mod: 26

## 2023-04-16 PROCEDURE — 36415 COLL VENOUS BLD VENIPUNCTURE: CPT

## 2023-04-16 PROCEDURE — 85025 COMPLETE CBC W/AUTO DIFF WBC: CPT

## 2023-04-16 PROCEDURE — 71046 X-RAY EXAM CHEST 2 VIEWS: CPT

## 2023-04-16 RX ORDER — MECLIZINE HCL 12.5 MG
25 TABLET ORAL ONCE
Refills: 0 | Status: COMPLETED | OUTPATIENT
Start: 2023-04-16 | End: 2023-04-16

## 2023-04-16 RX ORDER — SODIUM CHLORIDE 9 MG/ML
1000 INJECTION INTRAMUSCULAR; INTRAVENOUS; SUBCUTANEOUS ONCE
Refills: 0 | Status: COMPLETED | OUTPATIENT
Start: 2023-04-16 | End: 2023-04-16

## 2023-04-16 RX ORDER — ONDANSETRON 8 MG/1
4 TABLET, FILM COATED ORAL ONCE
Refills: 0 | Status: COMPLETED | OUTPATIENT
Start: 2023-04-16 | End: 2023-04-16

## 2023-04-16 RX ORDER — DIAZEPAM 5 MG
5 TABLET ORAL ONCE
Refills: 0 | Status: DISCONTINUED | OUTPATIENT
Start: 2023-04-16 | End: 2023-04-16

## 2023-04-16 RX ORDER — MECLIZINE HCL 12.5 MG
3 TABLET ORAL
Qty: 20 | Refills: 0
Start: 2023-04-16

## 2023-04-16 RX ADMIN — SODIUM CHLORIDE 2000 MILLILITER(S): 9 INJECTION INTRAMUSCULAR; INTRAVENOUS; SUBCUTANEOUS at 15:15

## 2023-04-16 RX ADMIN — ONDANSETRON 4 MILLIGRAM(S): 8 TABLET, FILM COATED ORAL at 15:15

## 2023-04-16 RX ADMIN — SODIUM CHLORIDE 1000 MILLILITER(S): 9 INJECTION INTRAMUSCULAR; INTRAVENOUS; SUBCUTANEOUS at 16:50

## 2023-04-16 RX ADMIN — Medication 25 MILLIGRAM(S): at 15:15

## 2023-04-16 RX ADMIN — Medication 5 MILLIGRAM(S): at 16:52

## 2023-04-16 NOTE — ED ADULT NURSE NOTE - CHIEF COMPLAINT QUOTE
pt arrived c/o dizziness x3 days. pt states this morning it got worse. pt states she has a history of veritgo and was seen at  " a few days ago and they took me I was dehydrated". pt states " this is exactly how I fel t the last time I was here. pt has equal strength in all extremities. pt states she has high blood pressure but has not taken her medication in a while. pt taken for EKG. ambulatory into triage  # [id number].

## 2023-04-16 NOTE — ED STATDOCS - PROGRESS NOTE DETAILS
signed Ella Gunter PA-C Pt seen and evaluated initially in intake by Dr. Pierce.  ID [id number]  57F c/o dizziness/vertigo x 3 days, worsened today with N/V. No hx trauma. Pt had similar symptoms a year ago, seen in ED, negative workup including CT. SYmptoms consistent today with BPPV, worsened by movement. No significant findings on labwork. Pt feeling a little better after initial meds. Will order PO valium and more fluids. Pt agrees with plan of  care. PMD [place].

## 2023-04-16 NOTE — ED ADULT TRIAGE NOTE - CHIEF COMPLAINT QUOTE
pt arrived c/o dizziness x3 days. pt states this morning it got worse. pt states she has a history of veritgo and was seen at  " a few days ago and they took me I was dehydrated". pt states " this is exactly how I fel t the last time I was here. pt has equal strength in all extremities. pt states she has high blood pressure but has not taken her medication in a while. pt taken for EKG. ambulatory into triage  # [id number].

## 2023-04-16 NOTE — ED STATDOCS - ATTENDING SHARED VISIT SELECTORS
I am seeing the patient in consult at the request of Dr. Gricelda Kathleen for medical clearance prior to surgery for cystoscopy with retrograde pyelogram due to ureteral mass.  Patient developed some blood in urine was subsequently referred to the urologist.  Denies any abdominal pain no blood in the urine, no nausea vomiting no weight changes.  He has known history of remote cerebrovascular accident a but no residual deficit.  He has known history of diabetes denies polyuria polydipsia.  He also has known history of chronic atrial fibrillation on warfarin denies any unusual bleeding or bruising.  He has diabetes only on metformin denies polyuria polydipsia    PAST MEDICAL HISTORY:    Hyperlipemia                                                  CVA (cerebral vascular accident) (CMS/Formerly [facility])      13         Comment: had balance issues-resolved    GERD (gastroesophageal reflux disease)                        Psoriasis                                                     Atrial fibrillation (CMS/Formerly [facility])                   2014         Comment: after Left ureteroscopy with laser lithotripsy                and stent placement    Calculus of ureter                              14        Comment: left    Diabetes mellitus (CMS/Formerly [facility])                                   PAST SURGICAL HISTORY:    ANKLE FRACTURE SURGERY                                          Comment: right    ORIF WRIST FRACTURE                                             Comment: left    CYSTOURETHROSCOPY /LITHOTRIPSY                  14        Comment: left    Family History   Problem Relation Age of Onset   • Heart disease Mother    • Patient is unaware of any medical problems Father    • Cancer Neg Hx    • Diabetes Neg Hx      Review of patient's family status indicates:    Mother                                           Father                         Alive                     Sister                         Alive                     Brother                                           Sister                                           Neg Hx                                                     Social History     Socioeconomic History   • Marital status: /Civil Union     Spouse name: Elva   • Number of children: 2   • Years of education: 9   • Highest education level: 9th grade   Occupational History   • Occupation: pipe builder   Tobacco Use   • Smoking status: Former Smoker   • Smokeless tobacco: Never Used   Vaping Use   • Vaping Use: never used   Substance and Sexual Activity   • Alcohol use: No     Alcohol/week: 0.0 standard drinks   • Drug use: No   • Sexual activity: Yes     Partners: Female   Other Topics Concern   •  Service Not Asked   • Blood Transfusions Not Asked   • Caffeine Concern Not Asked   • Occupational Exposure Not Asked   • Hobby Hazards Not Asked   • Sleep Concern Not Asked   • Stress Concern Not Asked   • Weight Concern Yes   • Special Diet No   • Back Care Not Asked   • Exercise No   • Bike Helmet Not Asked   • Seat Belt Yes   • Self-Exams Not Asked   Social History Narrative    Lives with his wife. Have a son and a daughter, one granddaughter. Works for [employer] as a . retired     Social Determinants of Health     Financial Resource Strain: Not on file   Food Insecurity: Not on file   Transportation Needs: Not on file   Physical Activity: Not on file   Stress: Not on file   Social Connections: Not on file   Intimate Partner Violence: Not on file        REVIEW OF SYSTEMS:  GENERAL: Denies weight loss, weight gain, fatigue and fever or chills  SKIN: Denies rashes and itching  EYES: Denies  blurry or double vision  EARS: Denies decreased hearing and ringing in ears (tinnitus)  NOSE: Denies stuffiness, discharge, nasal pain and nose bleeds  MOUTH: Denies bleeding of gums, dry mouth, sore throat and hoarseness or voice change  NECK: Denies pain and limited range of motion in the neck  RESPIRATORY:  Denies cough, dry or wet, productive, shortness of breath and wheezing   CARDIO: Denies chest pain or discomfort, palpitations and pt does complain of dyspnea  VASCULAR: Denies claudication with walking and pt does complain of occ leg cramps  GASTROINTESTINAL: Denies heartburn, nausea, change in bowel habits and rectal bleeding  GENITOURINARY/MALE: Denies  dysuria, hematuria and hernia  NEUROLOGICAL: Denies dizziness, fainting, seizures, confusion, weakness, numbness, tingling, tremor and trouble with balance or co-ordination  MUSCULOSKELETEL: Denies joint stiffness, joint pain, joint swelling, muscle pain and back pain  HEMATOLOGIC: Denies ease of bruising and ease of bleeding  ENDOCRINE: Denies heat or cold intolerance, frequent urination, thirst and change in appetite  PSYCHIATRIC: Denies nervousness, depression and social avoidance      PHYSICAL EXAM:   Blood pressure 122/88, pulse 99, temperature 97.7 °F (36.5 °C), temperature source Oral, resp. rate 20, height 5' 6\" (1.676 m), weight 127.7 kg (281 lb 8.4 oz), SpO2 97 %.   HEENT: Normocephalic, Atraumatic.  PERRLA, EOMI, there is no AV nicking, fundi are benign. Tympanic membranes are clear bilaterally.  Normal nasal and oral mucosa.  NECK: supple, no lymphadenopathy  LUNGS:  Lungs are clear to auscultation.  No wheezes, rales or rhonchi.  HEART:    ABDOMEN:   Soft, nontender, no masses, no hepatosplenomegaly, no distension, bowel sounds are normoactive.  EXTREMITIES:  Hyperpigmentation on the mid to the distal leg bilateral trace by pedal edema  NEUROLOGICAL: CN 2-12 grossly intact, DTR's 2+/4+ bilaterally and symmetrical, normal strength,  SKIN: warm, moist, without erythema, rash, or lesions  MENTAL STATUS: A&O X 3, normal thought, mood and affect, no hallucinations noted.    Assessment / Plan:    .  Patient has no reversible risk.    OK for surgery with patient's verbal agreement - with understanding of risk as noted above - with the following to be done  prior to surgery:  EKG and Cardiac consultation.    System Review:    Cardiovascular:  Patient is able to walk up 1 flight(s) of stairs.  Patient is able to do 4-6 METS of work.  No additional work up needed    Pulmonary:  Patient is at increased pulmonary complication due to obesity.  Obesity as noted in BMI above.  Please use incentive spirometry post-operatively  Please mobilize patient promptly in the post-operative time frame.    Infection risk:  Patient is at increased risk of infectious complication due to diabetes, strict post-operative glucose control recommended  Please delay feeding of the patient until fully awake and upright to avoid risk of aspiration.    Cognitive risk:  Patient is not at increased risk of cognitive complication and has no baseline cognitive issues    Chronic medical problems as noted above.  Patient is informed to hold Coumadin/Warfarin and is in verbal agreement of this plan with understanding of this risk. and and does not need to bridged with Lovenox prior to the surgery and hold warfarin for 3 days  Post operative medical care to be provided by:  Surgeon    Patient is cleared for surgery    ASSESSMENT: Pre-op exam  (primary encounter diagnosis)  Plan: URINE, BACTERIAL CULTURE, CBC WITH DIFFERENTIAL    Mass of ureter    Type 2 diabetes mellitus without complication, without long-term current use of insulin (CMS/HCC)  Stable continue medications  Atrial fibrillation, chronic (CMS/HCC)  Her Dr. Medina patient does not need to bridge with Lovenox prior to procedure but to hold warfarin for 3 days prior to the surgery  CC: Dr. Toni Ortiz  CC: Dr. Gricelda Kathleen    F/u pcp 3 mon      History/Exam/Medical Decision Making

## 2023-04-16 NOTE — ED ADULT NURSE NOTE - OBJECTIVE STATEMENT
Pt presents to ER c/o dizziness and HA. onset of symptoms began 3 days PTA and became exacerbated this morning. Pt reports having similar symptoms recently. Pt reports hx of HTN and vertigo. AO x 3

## 2023-04-16 NOTE — ED STATDOCS - NSFOLLOWUPCLINICS_GEN_ALL_ED_FT
[facility]  Family Medicine  [address] [place] [address]  Phone: [phone]  Fax:

## 2023-04-16 NOTE — ED STATDOCS - NSFOLLOWUPINSTRUCTIONS_ED_ALL_ED_FT
SIGUE A TU MÉDICO EN 1-2 DÍAS. REGRESE A LA ER PARA CUALQUIER SÍNTOMA PENDIENTE O NUEVAS PREOCUPACIONES.     Vértigo  Vertigo  El vértigo es la sensación de que usted o todo lo que lo rodea se mueve cuando en realidad eso no sucede. Esta sensación puede aparecer y desaparecer en cualquier momento. El vértigo suele desaparecer solo. El vértigo puede ser peligroso si ocurre mientras está haciendo algo que podría suponer un riesgo para usted y para los demás, por ejemplo, conduciendo un automóvil u operando maquinaria.  Coyle médico le hará estudios para determinar la causa del vértigo. Los estudios también ayudarán al médico a decidir cuál es la mejor manera de tratar coyle afección.  Siga estas indicaciones en coyle casa:  Comida y bebida         Shanon suficiente líquido radha para mantener la orina de color amarillo pálido.No shanon alcohol.Actividad     Retome juanita actividades normales según lo indicado por el médico. Pregúntele al médico qué actividades son seguras para usted.Por la mañana, siéntese sandra a un lado de la cama. Cuando se sienta bal, póngase lentamente de pie mientras se sostiene de algo, hasta que sepa que ha logrado el equilibrio.Muévase lentamente. Evite algunas posiciones o determinados movimientos repentinos de la rowan y el cuerpo radha se lo haya indicado el médico.Si tiene dificultad para caminar o mantener el equilibrio, use un bastón para mantener la estabilidad. Si se siente mareado o inestable, siéntese de inmediato.No jose m ninguna tarea que podría ponerlo en riesgo a usted o a otras personas en stacie de tener vértigo.Evite agacharse si se siente mareado. En coyle casa, coloque los objetos de modo que le resulte fácil alcanzarlos sin agacharse.No conduzca vehículos ni opere maquinaria pesada si se siente mareado.Indicaciones generales     [place] los medicamentos de venta josé antonio y los recetados solamente radha se lo haya indicado el médico.Concurra a todas las visitas de seguimiento radha se lo haya indicado el médico. [place] es importante.Comuníquese con un médico si:  Los medicamentos no le alivian el vértigo o skye empeora.Tiene fiebre.Coyle afección empeora o presenta síntomas nuevos.Juanita familiares o amigos advierten cambios en coyle comportamiento.Las náuseas o los vómitos empeoran.Tiene adormecimiento o sensación de pinchazos y hormigueo en emilio parte del cuerpo.Solicite ayuda inmediatamente si:  Tiene dificultad para moverse o hablar.Está mareado todo el tiempo.Se desmaya.Presenta diana de rowan intensos.Tiene debilidad en las dain, los brazos o las piernas.Presenta cambios en la audición o la visión.Presenta rigidez en el vern.Presenta sensibilidad a la jair.Resumen  El vértigo es la sensación de que usted o todo lo que lo rodea se mueve cuando en realidad eso no sucede.Coyle médico le hará estudios para determinar la causa del vértigo.Siga las indicaciones de cuidado en el hogar. Juan Jose vez le indiquen que evite ciertas tareas, posiciones o movimientos.Comuníquese con un médico si los medicamentos no alivian los síntomas o si tiene fiebre, náuseas, vómitos o cambios en el comportamiento.Solicite ayuda de inmediato si tiene diana de rowan intensos o dificultad para hablar, o si experimenta problemas auditivos o de visión.Esta información no tiene radha fin reemplazar el consejo del médico. Asegúrese de hacerle al médico cualquier pregunta que tenga.

## 2023-04-16 NOTE — ED STATDOCS - ATTENDING APP SHARED VISIT CONTRIBUTION OF CARE
I, Niraj Pierce MD,  performed the initial face to face bedside interview with this patient regarding history of present illness, review of symptoms and relevant past medical, social and family history.  I completed an independent physical examination.  I was the initial provider who evaluated this patient.   I personally saw the patient and performed a substantive portion of the visit including all aspects of the medical decision making.  I have signed out the follow up of any pending tests (i.e. labs, radiological studies) to the LILIA.  I have communicated the patient’s plan of care and disposition with the LILIA.  The history, relevant review of systems, past medical and surgical history, medical decision making, and physical examination was documented by the scribe in my presence and I attest to the accuracy of the documentation.

## 2023-04-16 NOTE — ED STATDOCS - PHYSICAL EXAMINATION
Constitutional: Awake, Alert, non-toxic. No acute distress.  HEAD: Normocephalic, atraumatic.   EYES: PERRL, EOM intact, conjunctiva and sclera are clear bilaterally.  ENT: External ears normal. No rhinorrhea, no tracheal deviation   NECK: Supple, non-tender  CARDIOVASCULAR: regular rate and rhythm.  RESPIRATORY: Normal respiratory effort; breath sounds CTAB, no wheezes, rhonchi, or rales. Speaking in full sentences. No accessory muscle use.   ABDOMEN: Soft; non-tender, non-distended. No rebound or guarding.   MSK:  no lower extremity edema, no deformities  SKIN: Warm, dry  NEURO: A&O x3. Sensory and motor functions are grossly intact. Speech is normal. CN 2-12 in tact. No facial droop. Normal finger to nose. Negative pronator drift. Negative romberg sign. 5/5 strength in bilateral upper and lower extremities. Sensation in tact to light touch in bilateral upper and lower extremities.   PSYCH: Appearance and judgement seem appropriate for gender and age.

## 2023-04-16 NOTE — ED STATDOCS - CLINICAL SUMMARY MEDICAL DECISION MAKING FREE TEXT BOX
Likely vertigo given similar hx, NIH: 0. Do no duane CVA no CP to suggest ACS or arrythmia. Possible dehydration. Will check labs, provide medications. Likely vertigo given similar hx, NIH: 0. Do no duane CVA no CP to suggest ACS or arrythmia. Possible dehydration. Will check labs, provide medications.    signed Ella Gunter PA-C  ID [id number]  Pt feeling and looks much better after valium. LIkely BPPV. No significant findings on labwork. rx meclizine. outpt f/u [facility]. return precautions given. Pt feeling well at DC, agrees with DC and plan of care. Getting ride home from family. Likely vertigo given similar hx, NIH: 0. Do no suspect CVA. no CP to suggest ACS or arrythmia. Possible dehydration. Will check labs, provide medications.    signed Ella Gunter PA-C  ID [id number]  Pt feeling and looks much better after valium. LIkely BPPV. No significant findings on labwork. rx meclizine. outpt f/u [facility]. return precautions given. Pt feeling well at DC, agrees with DC and plan of care. Getting ride home from family.

## 2023-04-16 NOTE — ED STATDOCS - PATIENT PORTAL LINK FT
You can access the FollowMyHealth Patient Portal offered by [facility] by registering at the following website: [email or web address]. By joining [employer]’s FollowMyHealth portal, you will also be able to view your health information using other applications (apps) compatible with our system.

## 2023-04-16 NOTE — ED STATDOCS - NS ED ATTENDING STATEMENT MOD
This was a shared visit with the LILIA. I reviewed and verified the documentation and independently performed the documented:

## 2023-04-16 NOTE — ED STATDOCS - OBJECTIVE STATEMENT
56 y/o F with a PMHx of pre-DM and HTN presents to the ED c/o dizziness x3 days. Today she began vomiting and dizziness worsens with bending over. pt was here last year for the same thing and states she felt better after receiving fluids and medications. PCP: Niko Langley.

## 2023-05-23 ENCOUNTER — APPOINTMENT (OUTPATIENT)
Dept: RADIOLOGY | Facility: CLINIC | Age: 58
End: 2023-05-23
Payer: MEDICAID

## 2023-05-23 ENCOUNTER — OUTPATIENT (OUTPATIENT)
Dept: OUTPATIENT SERVICES | Facility: HOSPITAL | Age: 58
LOS: 1 days | End: 2023-05-23
Payer: MEDICAID

## 2023-05-23 DIAGNOSIS — M79.671 PAIN IN RIGHT FOOT: ICD-10-CM

## 2023-05-23 PROCEDURE — 73630 X-RAY EXAM OF FOOT: CPT | Mod: 26,RT

## 2023-05-23 PROCEDURE — 73630 X-RAY EXAM OF FOOT: CPT

## 2023-10-25 ENCOUNTER — EMERGENCY (EMERGENCY)
Facility: HOSPITAL | Age: 58
LOS: 0 days | Discharge: HOME | End: 2023-10-26
Attending: EMERGENCY MEDICINE
Payer: MEDICAID

## 2023-10-25 VITALS — HEIGHT: 62 IN | WEIGHT: 179.9 LBS

## 2023-10-25 DIAGNOSIS — R42 DIZZINESS AND GIDDINESS: ICD-10-CM

## 2023-10-25 DIAGNOSIS — I10 ESSENTIAL (PRIMARY) HYPERTENSION: ICD-10-CM

## 2023-10-25 DIAGNOSIS — H53.8 OTHER VISUAL DISTURBANCES: ICD-10-CM

## 2023-10-25 DIAGNOSIS — E11.9 TYPE 2 DIABETES MELLITUS WITHOUT COMPLICATIONS: ICD-10-CM

## 2023-10-25 DIAGNOSIS — R11.2 NAUSEA WITH VOMITING, UNSPECIFIED: ICD-10-CM

## 2023-10-25 LAB
ALBUMIN SERPL ELPH-MCNC: 4.1 G/DL — SIGNIFICANT CHANGE UP (ref 3.3–5)
ALBUMIN SERPL ELPH-MCNC: 4.1 G/DL — SIGNIFICANT CHANGE UP (ref 3.3–5)
ALP SERPL-CCNC: 179 U/L — HIGH (ref 40–120)
ALP SERPL-CCNC: 179 U/L — HIGH (ref 40–120)
ALT FLD-CCNC: 71 U/L — SIGNIFICANT CHANGE UP (ref 12–78)
ALT FLD-CCNC: 71 U/L — SIGNIFICANT CHANGE UP (ref 12–78)
ANION GAP SERPL CALC-SCNC: 5 MMOL/L — SIGNIFICANT CHANGE UP (ref 5–17)
ANION GAP SERPL CALC-SCNC: 5 MMOL/L — SIGNIFICANT CHANGE UP (ref 5–17)
AST SERPL-CCNC: 47 U/L — HIGH (ref 15–37)
AST SERPL-CCNC: 47 U/L — HIGH (ref 15–37)
BASOPHILS # BLD AUTO: 0.06 K/UL — SIGNIFICANT CHANGE UP (ref 0–0.2)
BASOPHILS # BLD AUTO: 0.06 K/UL — SIGNIFICANT CHANGE UP (ref 0–0.2)
BASOPHILS NFR BLD AUTO: 0.6 % — SIGNIFICANT CHANGE UP (ref 0–2)
BASOPHILS NFR BLD AUTO: 0.6 % — SIGNIFICANT CHANGE UP (ref 0–2)
BILIRUB SERPL-MCNC: 0.3 MG/DL — SIGNIFICANT CHANGE UP (ref 0.2–1.2)
BILIRUB SERPL-MCNC: 0.3 MG/DL — SIGNIFICANT CHANGE UP (ref 0.2–1.2)
BUN SERPL-MCNC: 17 MG/DL — SIGNIFICANT CHANGE UP (ref 7–23)
BUN SERPL-MCNC: 17 MG/DL — SIGNIFICANT CHANGE UP (ref 7–23)
CALCIUM SERPL-MCNC: 9.5 MG/DL — SIGNIFICANT CHANGE UP (ref 8.5–10.1)
CALCIUM SERPL-MCNC: 9.5 MG/DL — SIGNIFICANT CHANGE UP (ref 8.5–10.1)
CHLORIDE SERPL-SCNC: 110 MMOL/L — HIGH (ref 96–108)
CHLORIDE SERPL-SCNC: 110 MMOL/L — HIGH (ref 96–108)
CO2 SERPL-SCNC: 29 MMOL/L — SIGNIFICANT CHANGE UP (ref 22–31)
CO2 SERPL-SCNC: 29 MMOL/L — SIGNIFICANT CHANGE UP (ref 22–31)
CREAT SERPL-MCNC: 0.67 MG/DL — SIGNIFICANT CHANGE UP (ref 0.5–1.3)
CREAT SERPL-MCNC: 0.67 MG/DL — SIGNIFICANT CHANGE UP (ref 0.5–1.3)
EGFR: 101 ML/MIN/1.73M2 — SIGNIFICANT CHANGE UP
EGFR: 101 ML/MIN/1.73M2 — SIGNIFICANT CHANGE UP
EOSINOPHIL # BLD AUTO: 0.17 K/UL — SIGNIFICANT CHANGE UP (ref 0–0.5)
EOSINOPHIL # BLD AUTO: 0.17 K/UL — SIGNIFICANT CHANGE UP (ref 0–0.5)
EOSINOPHIL NFR BLD AUTO: 1.7 % — SIGNIFICANT CHANGE UP (ref 0–6)
EOSINOPHIL NFR BLD AUTO: 1.7 % — SIGNIFICANT CHANGE UP (ref 0–6)
GLUCOSE SERPL-MCNC: 167 MG/DL — HIGH (ref 70–99)
GLUCOSE SERPL-MCNC: 167 MG/DL — HIGH (ref 70–99)
HCT VFR BLD CALC: 42.7 % — SIGNIFICANT CHANGE UP (ref 34.5–45)
HCT VFR BLD CALC: 42.7 % — SIGNIFICANT CHANGE UP (ref 34.5–45)
HGB BLD-MCNC: 14.6 G/DL — SIGNIFICANT CHANGE UP (ref 11.5–15.5)
HGB BLD-MCNC: 14.6 G/DL — SIGNIFICANT CHANGE UP (ref 11.5–15.5)
IMM GRANULOCYTES NFR BLD AUTO: 0.2 % — SIGNIFICANT CHANGE UP (ref 0–0.9)
IMM GRANULOCYTES NFR BLD AUTO: 0.2 % — SIGNIFICANT CHANGE UP (ref 0–0.9)
LYMPHOCYTES # BLD AUTO: 29.8 % — SIGNIFICANT CHANGE UP (ref 13–44)
LYMPHOCYTES # BLD AUTO: 29.8 % — SIGNIFICANT CHANGE UP (ref 13–44)
LYMPHOCYTES # BLD AUTO: 3.05 K/UL — SIGNIFICANT CHANGE UP (ref 1–3.3)
LYMPHOCYTES # BLD AUTO: 3.05 K/UL — SIGNIFICANT CHANGE UP (ref 1–3.3)
MCHC RBC-ENTMCNC: 30.7 PG — SIGNIFICANT CHANGE UP (ref 27–34)
MCHC RBC-ENTMCNC: 30.7 PG — SIGNIFICANT CHANGE UP (ref 27–34)
MCHC RBC-ENTMCNC: 34.2 GM/DL — SIGNIFICANT CHANGE UP (ref 32–36)
MCHC RBC-ENTMCNC: 34.2 GM/DL — SIGNIFICANT CHANGE UP (ref 32–36)
MCV RBC AUTO: 89.9 FL — SIGNIFICANT CHANGE UP (ref 80–100)
MCV RBC AUTO: 89.9 FL — SIGNIFICANT CHANGE UP (ref 80–100)
MONOCYTES # BLD AUTO: 0.64 K/UL — SIGNIFICANT CHANGE UP (ref 0–0.9)
MONOCYTES # BLD AUTO: 0.64 K/UL — SIGNIFICANT CHANGE UP (ref 0–0.9)
MONOCYTES NFR BLD AUTO: 6.3 % — SIGNIFICANT CHANGE UP (ref 2–14)
MONOCYTES NFR BLD AUTO: 6.3 % — SIGNIFICANT CHANGE UP (ref 2–14)
NEUTROPHILS # BLD AUTO: 6.29 K/UL — SIGNIFICANT CHANGE UP (ref 1.8–7.4)
NEUTROPHILS # BLD AUTO: 6.29 K/UL — SIGNIFICANT CHANGE UP (ref 1.8–7.4)
NEUTROPHILS NFR BLD AUTO: 61.4 % — SIGNIFICANT CHANGE UP (ref 43–77)
NEUTROPHILS NFR BLD AUTO: 61.4 % — SIGNIFICANT CHANGE UP (ref 43–77)
PLATELET # BLD AUTO: 249 K/UL — SIGNIFICANT CHANGE UP (ref 150–400)
PLATELET # BLD AUTO: 249 K/UL — SIGNIFICANT CHANGE UP (ref 150–400)
POTASSIUM SERPL-MCNC: 3.9 MMOL/L — SIGNIFICANT CHANGE UP (ref 3.5–5.3)
POTASSIUM SERPL-MCNC: 3.9 MMOL/L — SIGNIFICANT CHANGE UP (ref 3.5–5.3)
POTASSIUM SERPL-SCNC: 3.9 MMOL/L — SIGNIFICANT CHANGE UP (ref 3.5–5.3)
POTASSIUM SERPL-SCNC: 3.9 MMOL/L — SIGNIFICANT CHANGE UP (ref 3.5–5.3)
PROT SERPL-MCNC: 8.6 GM/DL — HIGH (ref 6–8.3)
PROT SERPL-MCNC: 8.6 GM/DL — HIGH (ref 6–8.3)
RBC # BLD: 4.75 M/UL — SIGNIFICANT CHANGE UP (ref 3.8–5.2)
RBC # BLD: 4.75 M/UL — SIGNIFICANT CHANGE UP (ref 3.8–5.2)
RBC # FLD: 12.2 % — SIGNIFICANT CHANGE UP (ref 10.3–14.5)
RBC # FLD: 12.2 % — SIGNIFICANT CHANGE UP (ref 10.3–14.5)
SODIUM SERPL-SCNC: 144 MMOL/L — SIGNIFICANT CHANGE UP (ref 135–145)
SODIUM SERPL-SCNC: 144 MMOL/L — SIGNIFICANT CHANGE UP (ref 135–145)
WBC # BLD: 10.23 K/UL — SIGNIFICANT CHANGE UP (ref 3.8–10.5)
WBC # BLD: 10.23 K/UL — SIGNIFICANT CHANGE UP (ref 3.8–10.5)
WBC # FLD AUTO: 10.23 K/UL — SIGNIFICANT CHANGE UP (ref 3.8–10.5)
WBC # FLD AUTO: 10.23 K/UL — SIGNIFICANT CHANGE UP (ref 3.8–10.5)

## 2023-10-25 PROCEDURE — 36415 COLL VENOUS BLD VENIPUNCTURE: CPT

## 2023-10-25 PROCEDURE — 99285 EMERGENCY DEPT VISIT HI MDM: CPT | Mod: 25

## 2023-10-25 PROCEDURE — 96374 THER/PROPH/DIAG INJ IV PUSH: CPT

## 2023-10-25 PROCEDURE — 96375 TX/PRO/DX INJ NEW DRUG ADDON: CPT

## 2023-10-25 PROCEDURE — 80053 COMPREHEN METABOLIC PANEL: CPT

## 2023-10-25 PROCEDURE — 93005 ELECTROCARDIOGRAM TRACING: CPT

## 2023-10-25 PROCEDURE — 70450 CT HEAD/BRAIN W/O DYE: CPT | Mod: MA

## 2023-10-25 PROCEDURE — 70450 CT HEAD/BRAIN W/O DYE: CPT | Mod: 26,MA

## 2023-10-25 PROCEDURE — 93010 ELECTROCARDIOGRAM REPORT: CPT

## 2023-10-25 PROCEDURE — 85025 COMPLETE CBC W/AUTO DIFF WBC: CPT

## 2023-10-25 PROCEDURE — 82962 GLUCOSE BLOOD TEST: CPT

## 2023-10-25 PROCEDURE — 99284 EMERGENCY DEPT VISIT MOD MDM: CPT

## 2023-10-25 RX ORDER — DIPHENHYDRAMINE HCL 50 MG
50 CAPSULE ORAL ONCE
Refills: 0 | Status: COMPLETED | OUTPATIENT
Start: 2023-10-25 | End: 2023-10-25

## 2023-10-25 RX ORDER — MECLIZINE HCL 12.5 MG
25 TABLET ORAL ONCE
Refills: 0 | Status: COMPLETED | OUTPATIENT
Start: 2023-10-25 | End: 2023-10-25

## 2023-10-25 RX ORDER — METOCLOPRAMIDE HCL 10 MG
10 TABLET ORAL ONCE
Refills: 0 | Status: COMPLETED | OUTPATIENT
Start: 2023-10-25 | End: 2023-10-25

## 2023-10-25 RX ORDER — SODIUM CHLORIDE 9 MG/ML
1000 INJECTION INTRAMUSCULAR; INTRAVENOUS; SUBCUTANEOUS ONCE
Refills: 0 | Status: COMPLETED | OUTPATIENT
Start: 2023-10-25 | End: 2023-10-25

## 2023-10-25 RX ADMIN — SODIUM CHLORIDE 1000 MILLILITER(S): 9 INJECTION INTRAMUSCULAR; INTRAVENOUS; SUBCUTANEOUS at 22:05

## 2023-10-25 RX ADMIN — Medication 50 MILLIGRAM(S): at 22:50

## 2023-10-25 RX ADMIN — Medication 25 MILLIGRAM(S): at 22:49

## 2023-10-25 RX ADMIN — Medication 10 MILLIGRAM(S): at 22:54

## 2023-10-25 NOTE — ED PROVIDER NOTE - OBJECTIVE STATEMENT
Pt is a 58y female with a PMH of HTN on Losartan, DM presents to the ED c/o dizziness worsened by movement and relieved w/ rest, w/ associated n/v onset 4pm. Pt states she does not have a h/o vertigo or stroke. States this is in the setting of a lack of sleep. Endorses blurry vision, denies HA.  in triage.

## 2023-10-25 NOTE — ED PROVIDER NOTE - PATIENT PORTAL LINK FT
You can access the FollowMyHealth Patient Portal offered by [facility] by registering at the following website: [email or web address]. By joining [employer]’s FollowMyHealth portal, you will also be able to view your health information using other applications (apps) compatible with our system.

## 2023-10-25 NOTE — ED PROVIDER NOTE - PROGRESS NOTE DETAILS
Patient reports feeling much better she is ambulating no acute distress ambulation trial was performed in ED do not suspect posterior circulation CVA no diplopia no dysphagia no dysmetria return to ED for intractable headache persistent vomiting or new onset motor or sensory deficits patient agrees to plan of care

## 2023-10-25 NOTE — ED PROVIDER NOTE - NSFOLLOWUPINSTRUCTIONS_ED_ALL_ED_FT
Paciente: ROSE MARIE AVILAPONCE  Profesional que asiste al paciente: Wolfgang Shrestha  Vértigo  Vertigo    El vértigo es la sensación de que usted o todo lo que lo rodea se mueve cuando en realidad eso no sucede. Esta sensación puede aparecer y desaparecer en cualquier momento. El vértigo suele desaparecer solo. El vértigo puede ser peligroso si ocurre mientras está haciendo algo que podría suponer un riesgo para usted y para los demás, por ejemplo, conduciendo un automóvil u operando maquinaria.    Coyle médico le hará estudios para determinar la causa del vértigo. Los estudios también ayudarán al médico a decidir cuál es la mejor manera de tratar coyle afección.    Siga estas indicaciones en coyle casa:      Comida y bebida      Shanon suficiente líquido radha para mantener la orina de color amarillo pálido.  No shanon alcohol.        Actividad    Retome juanita actividades normales según lo indicado por el médico. Pregúntele al médico qué actividades son seguras para usted.  Por la mañana, siéntese sandra a un lado de la cama. Cuando se sienta bal, póngase lentamente de pie mientras se sostiene de algo, hasta que sepa que ha logrado el equilibrio.  Muévase lentamente. Evite algunas posiciones o determinados movimientos repentinos de la rowan y el cuerpo radha se lo haya indicado el médico.  Si tiene dificultad para caminar o mantener el equilibrio, use un bastón para mantener la estabilidad. Si se siente mareado o inestable, siéntese de inmediato.  No jose m ninguna tarea que podría ponerlo en riesgo a usted o a otras personas en stacie de tener vértigo.  Evite agacharse si se siente mareado. En coyle casa, coloque los objetos de modo que le resulte fácil alcanzarlos sin agacharse.  No conduzca vehículos ni opere maquinaria pesada si se siente mareado.        Indicaciones generales    [place] los medicamentos de venta josé antonio y los recetados solamente radha se lo haya indicado el médico.  Concurra a todas las visitas de seguimiento radha se lo haya indicado el médico. [place] es importante.    Comuníquese con un médico si:  Los medicamentos no le alivian el vértigo o skye empeora.  Tiene fiebre.  Coyle afección empeora o presenta síntomas nuevos.  Juanita familiares o amigos advierten cambios en coyle comportamiento.  Las náuseas o los vómitos empeoran.  Tiene adormecimiento o sensación de pinchazos y hormigueo en emilio parte del cuerpo.    Solicite ayuda inmediatamente si:  Tiene dificultad para moverse o hablar.  Está mareado todo el tiempo.  Se desmaya.  Presenta diana de rowan intensos.  Tiene debilidad en las dain, los brazos o las piernas.  Presenta cambios en la audición o la visión.  Presenta rigidez en el vern.  Presenta sensibilidad a la jair.    Resumen  El vértigo es la sensación de que usted o todo lo que lo rodea se mueve cuando en realidad eso no sucede.  Coyle médico le hará estudios para determinar la causa del vértigo.  Siga las indicaciones de cuidado en el hogar. Juan Jose vez le indiquen que evite ciertas tareas, posiciones o movimientos.  Comuníquese con un médico si los medicamentos no alivian los síntomas o si tiene fiebre, náuseas, vómitos o cambios en el comportamiento.  Solicite ayuda de inmediato si tiene diana de rowan intensos o dificultad para hablar, o si experimenta problemas auditivos o de visión.    NOTAS ADICIONALES E INSTRUCCIONES    Please follow up with your Primary MD in 24-48 hr.  Seek immediate medical care for any new/worsening signs or symptoms.     Document Released: 9/27/2006 Document Revised: 11/11/2019 Document Reviewed: 11/11/2019  [employer] Interactive Patient Education ©2019 [employer] This information is not intended to replace advice given to you by your health care provider. Make sure you discuss any questions you have with your health care provider.

## 2023-10-25 NOTE — ED ADULT TRIAGE NOTE - CHIEF COMPLAINT QUOTE
Pt. presents to ED c/o dizziness and nausea/vomiting since 4pm. Pt. reports dizziness worsens with movement. Denies HA. Pt. evaluated by Dr Randolph in triage, no code stroke at this time.  in triage

## 2023-10-25 NOTE — ED PROVIDER NOTE - CLINICAL SUMMARY MEDICAL DECISION MAKING FREE TEXT BOX
58y female w/ a h/o dizziness p/w dizziness and n/v starting today. VSS, no fever. NVI, no ataxia, normal ambulation. No signs of stroke. Appears to be vertigo. Plan for symptomatic treatment, fluids, and meds. Will CT head given projectile vomiting. If patient feels better will w/u negative and d/c.

## 2023-10-26 VITALS
RESPIRATION RATE: 16 BRPM | DIASTOLIC BLOOD PRESSURE: 82 MMHG | OXYGEN SATURATION: 100 % | SYSTOLIC BLOOD PRESSURE: 151 MMHG | HEART RATE: 81 BPM | TEMPERATURE: 98 F

## 2023-10-26 RX ORDER — DIPHENHYDRAMINE HCL 50 MG
1 CAPSULE ORAL
Qty: 10 | Refills: 0
Start: 2023-10-26 | End: 2023-10-30

## 2023-10-26 RX ORDER — MECLIZINE HCL 12.5 MG
1 TABLET ORAL
Qty: 10 | Refills: 0
Start: 2023-10-26 | End: 2023-10-30

## 2023-10-26 NOTE — ED ADULT NURSE NOTE - NSFALLRISKINTERV_ED_ALL_ED
Assistance OOB with selected safe patient handling equipment if applicable/Assistance with ambulation/Communicate fall risk and risk factors to all staff, patient, and family/Encourage patient to sit up slowly, dangle for a short time, stand at bedside before walking/Monitor gait and stability/Orthostatic vital signs/Provide patient with walking aids/Provide visual cue: yellow wristband, yellow gown, etc/Reinforce activity limits and safety measures with patient and family/Call bell, personal items and telephone in reach/Instruct patient to call for assistance before getting out of bed/chair/stretcher/Non-slip footwear applied when patient is off stretcher/[place] to call system/Physically safe environment - no spills, clutter or unnecessary equipment/Purposeful Proactive Rounding/Room/bathroom lighting operational, light cord in reach

## 2023-10-26 NOTE — ED ADULT NURSE NOTE - OBJECTIVE STATEMENT
Pt presented to ED c/o dizziness and nausea/vomiting since 4pm. Pt. reports dizziness worsens with movement. Denies HA.

## 2024-04-02 ENCOUNTER — APPOINTMENT (OUTPATIENT)
Dept: MAMMOGRAPHY | Facility: CLINIC | Age: 59
End: 2024-04-02
Payer: COMMERCIAL

## 2024-04-02 ENCOUNTER — OUTPATIENT (OUTPATIENT)
Dept: OUTPATIENT SERVICES | Facility: HOSPITAL | Age: 59
LOS: 1 days | End: 2024-04-02
Payer: COMMERCIAL

## 2024-04-02 PROCEDURE — 77067 SCR MAMMO BI INCL CAD: CPT | Mod: 26

## 2024-04-02 PROCEDURE — 77067 SCR MAMMO BI INCL CAD: CPT

## 2024-04-02 PROCEDURE — 77063 BREAST TOMOSYNTHESIS BI: CPT

## 2024-04-02 PROCEDURE — 77063 BREAST TOMOSYNTHESIS BI: CPT | Mod: 26

## 2024-04-13 ENCOUNTER — EMERGENCY (EMERGENCY)
Facility: HOSPITAL | Age: 59
LOS: 0 days | Discharge: HOME | End: 2024-04-13
Attending: EMERGENCY MEDICINE
Payer: COMMERCIAL

## 2024-04-13 VITALS
OXYGEN SATURATION: 99 % | HEART RATE: 71 BPM | SYSTOLIC BLOOD PRESSURE: 152 MMHG | RESPIRATION RATE: 18 BRPM | TEMPERATURE: 98 F | DIASTOLIC BLOOD PRESSURE: 84 MMHG

## 2024-04-13 VITALS — HEIGHT: 61 IN | WEIGHT: 149.91 LBS

## 2024-04-13 DIAGNOSIS — R42 DIZZINESS AND GIDDINESS: ICD-10-CM

## 2024-04-13 DIAGNOSIS — R73.03 PREDIABETES: ICD-10-CM

## 2024-04-13 DIAGNOSIS — R68.2 DRY MOUTH, UNSPECIFIED: ICD-10-CM

## 2024-04-13 DIAGNOSIS — R11.0 NAUSEA: ICD-10-CM

## 2024-04-13 DIAGNOSIS — H93.12 TINNITUS, LEFT EAR: ICD-10-CM

## 2024-04-13 PROCEDURE — 70450 CT HEAD/BRAIN W/O DYE: CPT | Mod: MC

## 2024-04-13 PROCEDURE — 70450 CT HEAD/BRAIN W/O DYE: CPT | Mod: 26,MC

## 2024-04-13 PROCEDURE — 99284 EMERGENCY DEPT VISIT MOD MDM: CPT

## 2024-04-13 PROCEDURE — 93010 ELECTROCARDIOGRAM REPORT: CPT

## 2024-04-13 PROCEDURE — 99284 EMERGENCY DEPT VISIT MOD MDM: CPT | Mod: 25

## 2024-04-13 PROCEDURE — 93005 ELECTROCARDIOGRAM TRACING: CPT

## 2024-04-13 RX ORDER — MECLIZINE HCL 12.5 MG
25 TABLET ORAL ONCE
Refills: 0 | Status: COMPLETED | OUTPATIENT
Start: 2024-04-13 | End: 2024-04-13

## 2024-04-13 RX ORDER — MECLIZINE HCL 12.5 MG
1 TABLET ORAL
Qty: 10 | Refills: 0
Start: 2024-04-13 | End: 2024-04-22

## 2024-04-13 RX ADMIN — Medication 25 MILLIGRAM(S): at 21:50

## 2024-04-13 NOTE — ED STATDOCS - PROGRESS NOTE DETAILS
Patient with a history of vertigo, ran out of medications, dizziness today. Denies n/v, fever or falls    Plan: Meclizine, CT head

## 2024-04-13 NOTE — ED STATDOCS - OBJECTIVE STATEMENT
59 y/o female with PMHx of prediabetes, vertigo ambulatory to ED c/o dizziness, vertigo this week with associated left ear tinnitus. 5 days ago began feeling dizzy, drank chamomile tea with brianna, symptoms improved. The next day woke up with buzzing in left ear. States the buzzing goes away when lying down at night. Dizziness aggravated oli bending over. Endorses nausea. Denies vomiting, headache, fever, new vision changes, difficulty breathing/swallowing. Has been to ED 3 times for similar symptoms. PCP Niko gilliam. NIEVES. : [id number]. 57 y/o female with PMHx of prediabetes, vertigo ambulatory to ED c/o dizziness, vertigo this week with associated left ear tinnitus. 5 days ago began feeling dizzy, drank chamomile tea with brianna, symptoms improved. The next day woke up with buzzing in left ear. States the buzzing goes away when lying down at night. Dizziness aggravated when bending over. Endorses nausea. Denies vomiting, headache, fever, new vision changes, difficulty breathing/swallowing. Has been to ED 3 times for similar symptoms, symptomatically responded to Meclizine po, no more available at home.  PCP [facility]. NKDA. : [phone].

## 2024-04-13 NOTE — ED STATDOCS - PATIENT PORTAL LINK FT
You can access the FollowMyHealth Patient Portal offered by [facility] by registering at the following website: [email or web address]. By joining [employer]’s FollowMyHealth portal, you will also be able to view your health information using other applications (apps) compatible with our system.

## 2024-04-13 NOTE — ED STATDOCS - CLINICAL SUMMARY MEDICAL DECISION MAKING FREE TEXT BOX
57 y/o female Thai speaking preDM vertigo ambulatory to ED c/o 4-5 days dizziness/vertigo aggravated by head movement, associated decreased hearing left ear, ?tinnitus. no headache, fever, chills. Left middle ear fluid filled, non erythematous. Plan: CT head, PO meclozine, EKG, ambulation trial, anticipate dc on meclozine for [facility] follow up this week, consider antihistamines. 59 y/o female Kyrgyz speaking preDM vertigo ambulatory to ED c/o 4-5 days dizziness/vertigo aggravated by head movement, associated decreased hearing left ear, ?tinnitus. no headache, fever, chills. Left middle ear fluid filled, non erythematous.   Plan: CT head, PO meclozine, EKG, ambulation trial, anticipate dc on meclozine for [facility] follow up this week, consider antihistamines.    ROSITA Tucker MD:  Ct head report: no acute abnl.  Pt symptomatically improved s/p Meclizine po admin.. in ED.  Pt stable for DC home with Meclizine e-script for PCP f/u.

## 2024-04-13 NOTE — ED STATDOCS - ATTENDING APP SHARED VISIT CONTRIBUTION OF CARE
YANIQUE Tucker MD, ED Attending physician:  This was a shared visit with LILIA.  I reviewed and verified the documentation and independently performed the documented history/exam/mdm.

## 2024-07-19 ENCOUNTER — EMERGENCY (EMERGENCY)
Facility: HOSPITAL | Age: 59
LOS: 0 days | Discharge: HOME | End: 2024-07-19
Attending: EMERGENCY MEDICINE
Payer: COMMERCIAL

## 2024-07-19 VITALS
OXYGEN SATURATION: 99 % | HEART RATE: 64 BPM | DIASTOLIC BLOOD PRESSURE: 78 MMHG | SYSTOLIC BLOOD PRESSURE: 122 MMHG | RESPIRATION RATE: 18 BRPM | TEMPERATURE: 98 F

## 2024-07-19 VITALS — HEIGHT: 63 IN

## 2024-07-19 DIAGNOSIS — R42 DIZZINESS AND GIDDINESS: ICD-10-CM

## 2024-07-19 DIAGNOSIS — H53.149 VISUAL DISCOMFORT, UNSPECIFIED: ICD-10-CM

## 2024-07-19 DIAGNOSIS — E11.9 TYPE 2 DIABETES MELLITUS WITHOUT COMPLICATIONS: ICD-10-CM

## 2024-07-19 DIAGNOSIS — R51.9 HEADACHE, UNSPECIFIED: ICD-10-CM

## 2024-07-19 DIAGNOSIS — I10 ESSENTIAL (PRIMARY) HYPERTENSION: ICD-10-CM

## 2024-07-19 LAB
ALBUMIN SERPL ELPH-MCNC: 4 G/DL — SIGNIFICANT CHANGE UP (ref 3.3–5)
ALP SERPL-CCNC: 166 U/L — HIGH (ref 40–120)
ALT FLD-CCNC: 46 U/L — SIGNIFICANT CHANGE UP (ref 12–78)
ANION GAP SERPL CALC-SCNC: 4 MMOL/L — LOW (ref 5–17)
APPEARANCE UR: CLEAR — SIGNIFICANT CHANGE UP
APTT BLD: 30 SEC — SIGNIFICANT CHANGE UP (ref 24.5–35.6)
AST SERPL-CCNC: 27 U/L — SIGNIFICANT CHANGE UP (ref 15–37)
BASOPHILS # BLD AUTO: 0.06 K/UL — SIGNIFICANT CHANGE UP (ref 0–0.2)
BASOPHILS NFR BLD AUTO: 0.7 % — SIGNIFICANT CHANGE UP (ref 0–2)
BILIRUB SERPL-MCNC: 0.3 MG/DL — SIGNIFICANT CHANGE UP (ref 0.2–1.2)
BILIRUB UR-MCNC: NEGATIVE — SIGNIFICANT CHANGE UP
BLD GP AB SCN SERPL QL: SIGNIFICANT CHANGE UP
BUN SERPL-MCNC: 16 MG/DL — SIGNIFICANT CHANGE UP (ref 7–23)
CALCIUM SERPL-MCNC: 9.3 MG/DL — SIGNIFICANT CHANGE UP (ref 8.5–10.1)
CHLORIDE SERPL-SCNC: 108 MMOL/L — SIGNIFICANT CHANGE UP (ref 96–108)
CO2 SERPL-SCNC: 28 MMOL/L — SIGNIFICANT CHANGE UP (ref 22–31)
COLOR SPEC: YELLOW — SIGNIFICANT CHANGE UP
CREAT SERPL-MCNC: 1 MG/DL — SIGNIFICANT CHANGE UP (ref 0.5–1.3)
DIFF PNL FLD: NEGATIVE — SIGNIFICANT CHANGE UP
EGFR: 65 ML/MIN/1.73M2 — SIGNIFICANT CHANGE UP
EOSINOPHIL # BLD AUTO: 0.32 K/UL — SIGNIFICANT CHANGE UP (ref 0–0.5)
EOSINOPHIL NFR BLD AUTO: 3.5 % — SIGNIFICANT CHANGE UP (ref 0–6)
GLUCOSE SERPL-MCNC: 115 MG/DL — HIGH (ref 70–99)
GLUCOSE UR QL: NEGATIVE MG/DL — SIGNIFICANT CHANGE UP
HCT VFR BLD CALC: 41.5 % — SIGNIFICANT CHANGE UP (ref 34.5–45)
HGB BLD-MCNC: 13.8 G/DL — SIGNIFICANT CHANGE UP (ref 11.5–15.5)
IMM GRANULOCYTES NFR BLD AUTO: 0.3 % — SIGNIFICANT CHANGE UP (ref 0–0.9)
INR BLD: 1 RATIO — SIGNIFICANT CHANGE UP (ref 0.85–1.18)
KETONES UR-MCNC: NEGATIVE MG/DL — SIGNIFICANT CHANGE UP
LEUKOCYTE ESTERASE UR-ACNC: NEGATIVE — SIGNIFICANT CHANGE UP
LYMPHOCYTES # BLD AUTO: 2.67 K/UL — SIGNIFICANT CHANGE UP (ref 1–3.3)
LYMPHOCYTES # BLD AUTO: 29.3 % — SIGNIFICANT CHANGE UP (ref 13–44)
MAGNESIUM SERPL-MCNC: 2.5 MG/DL — SIGNIFICANT CHANGE UP (ref 1.6–2.6)
MCHC RBC-ENTMCNC: 29.9 PG — SIGNIFICANT CHANGE UP (ref 27–34)
MCHC RBC-ENTMCNC: 33.3 GM/DL — SIGNIFICANT CHANGE UP (ref 32–36)
MCV RBC AUTO: 90 FL — SIGNIFICANT CHANGE UP (ref 80–100)
MONOCYTES # BLD AUTO: 0.68 K/UL — SIGNIFICANT CHANGE UP (ref 0–0.9)
MONOCYTES NFR BLD AUTO: 7.5 % — SIGNIFICANT CHANGE UP (ref 2–14)
NEUTROPHILS # BLD AUTO: 5.36 K/UL — SIGNIFICANT CHANGE UP (ref 1.8–7.4)
NEUTROPHILS NFR BLD AUTO: 58.7 % — SIGNIFICANT CHANGE UP (ref 43–77)
NITRITE UR-MCNC: NEGATIVE — SIGNIFICANT CHANGE UP
PH UR: 7 — SIGNIFICANT CHANGE UP (ref 5–8)
PHOSPHATE SERPL-MCNC: 2.9 MG/DL — SIGNIFICANT CHANGE UP (ref 2.5–4.5)
PLATELET # BLD AUTO: 223 K/UL — SIGNIFICANT CHANGE UP (ref 150–400)
POTASSIUM SERPL-MCNC: 4 MMOL/L — SIGNIFICANT CHANGE UP (ref 3.5–5.3)
POTASSIUM SERPL-SCNC: 4 MMOL/L — SIGNIFICANT CHANGE UP (ref 3.5–5.3)
PROT SERPL-MCNC: 8.3 GM/DL — SIGNIFICANT CHANGE UP (ref 6–8.3)
PROT UR-MCNC: NEGATIVE MG/DL — SIGNIFICANT CHANGE UP
PROTHROM AB SERPL-ACNC: 11.3 SEC — SIGNIFICANT CHANGE UP (ref 9.5–13)
RBC # BLD: 4.61 M/UL — SIGNIFICANT CHANGE UP (ref 3.8–5.2)
RBC # FLD: 12.3 % — SIGNIFICANT CHANGE UP (ref 10.3–14.5)
SODIUM SERPL-SCNC: 140 MMOL/L — SIGNIFICANT CHANGE UP (ref 135–145)
SP GR SPEC: 1.01 — SIGNIFICANT CHANGE UP (ref 1–1.03)
TROPONIN I, HIGH SENSITIVITY RESULT: 5.15 NG/L — SIGNIFICANT CHANGE UP
UROBILINOGEN FLD QL: 0.2 MG/DL — SIGNIFICANT CHANGE UP (ref 0.2–1)
WBC # BLD: 9.12 K/UL — SIGNIFICANT CHANGE UP (ref 3.8–10.5)
WBC # FLD AUTO: 9.12 K/UL — SIGNIFICANT CHANGE UP (ref 3.8–10.5)

## 2024-07-19 PROCEDURE — 84484 ASSAY OF TROPONIN QUANT: CPT

## 2024-07-19 PROCEDURE — 85025 COMPLETE CBC W/AUTO DIFF WBC: CPT

## 2024-07-19 PROCEDURE — 99285 EMERGENCY DEPT VISIT HI MDM: CPT | Mod: 25

## 2024-07-19 PROCEDURE — 80053 COMPREHEN METABOLIC PANEL: CPT

## 2024-07-19 PROCEDURE — 86900 BLOOD TYPING SEROLOGIC ABO: CPT

## 2024-07-19 PROCEDURE — 99285 EMERGENCY DEPT VISIT HI MDM: CPT

## 2024-07-19 PROCEDURE — 70450 CT HEAD/BRAIN W/O DYE: CPT | Mod: 26,MC

## 2024-07-19 PROCEDURE — 36415 COLL VENOUS BLD VENIPUNCTURE: CPT

## 2024-07-19 PROCEDURE — 70450 CT HEAD/BRAIN W/O DYE: CPT | Mod: MC

## 2024-07-19 PROCEDURE — 84100 ASSAY OF PHOSPHORUS: CPT

## 2024-07-19 PROCEDURE — 81003 URINALYSIS AUTO W/O SCOPE: CPT

## 2024-07-19 PROCEDURE — 93005 ELECTROCARDIOGRAM TRACING: CPT

## 2024-07-19 PROCEDURE — 85730 THROMBOPLASTIN TIME PARTIAL: CPT

## 2024-07-19 PROCEDURE — 86850 RBC ANTIBODY SCREEN: CPT

## 2024-07-19 PROCEDURE — 82962 GLUCOSE BLOOD TEST: CPT

## 2024-07-19 PROCEDURE — 83735 ASSAY OF MAGNESIUM: CPT

## 2024-07-19 PROCEDURE — 85610 PROTHROMBIN TIME: CPT

## 2024-07-19 PROCEDURE — 96374 THER/PROPH/DIAG INJ IV PUSH: CPT | Mod: XU

## 2024-07-19 PROCEDURE — 93010 ELECTROCARDIOGRAM REPORT: CPT

## 2024-07-19 PROCEDURE — 86901 BLOOD TYPING SEROLOGIC RH(D): CPT

## 2024-07-19 RX ORDER — MECLIZINE HCL 25 MG
25 TABLET ORAL ONCE
Refills: 0 | Status: COMPLETED | OUTPATIENT
Start: 2024-07-19 | End: 2024-07-19

## 2024-07-19 RX ORDER — SODIUM CHLORIDE 0.9 % (FLUSH) 0.9 %
1000 SYRINGE (ML) INJECTION ONCE
Refills: 0 | Status: COMPLETED | OUTPATIENT
Start: 2024-07-19 | End: 2024-07-19

## 2024-07-19 RX ORDER — KETOROLAC TROMETHAMINE 30 MG/ML
30 INJECTION, SOLUTION INTRAMUSCULAR ONCE
Refills: 0 | Status: DISCONTINUED | OUTPATIENT
Start: 2024-07-19 | End: 2024-07-19

## 2024-07-19 RX ORDER — MECLIZINE HCL 25 MG
1 TABLET ORAL
Qty: 15 | Refills: 0
Start: 2024-07-19 | End: 2024-07-23

## 2024-07-19 RX ADMIN — Medication 25 MILLIGRAM(S): at 14:16

## 2024-07-19 RX ADMIN — KETOROLAC TROMETHAMINE 30 MILLIGRAM(S): 30 INJECTION, SOLUTION INTRAMUSCULAR at 16:03

## 2024-07-19 RX ADMIN — Medication 2000 MILLILITER(S): at 14:16

## 2024-09-11 NOTE — ED ADULT NURSE NOTE - CAS TRG GENERAL AIRWAY, MLM
I spoke to patient and offered appointment tomorrow w/Shala BROWN patient declined-wanted to speak to a nurse so I sent him to triage line.   Patent

## 2024-11-04 ENCOUNTER — EMERGENCY (EMERGENCY)
Facility: HOSPITAL | Age: 59
LOS: 0 days | Discharge: HOME | End: 2024-11-04
Attending: EMERGENCY MEDICINE
Payer: COMMERCIAL

## 2024-11-04 VITALS
TEMPERATURE: 99 F | RESPIRATION RATE: 18 BRPM | DIASTOLIC BLOOD PRESSURE: 85 MMHG | SYSTOLIC BLOOD PRESSURE: 140 MMHG | HEART RATE: 93 BPM | OXYGEN SATURATION: 96 %

## 2024-11-04 DIAGNOSIS — B34.9 VIRAL INFECTION, UNSPECIFIED: ICD-10-CM

## 2024-11-04 DIAGNOSIS — R42 DIZZINESS AND GIDDINESS: ICD-10-CM

## 2024-11-04 DIAGNOSIS — Z79.84 LONG TERM (CURRENT) USE OF ORAL HYPOGLYCEMIC DRUGS: ICD-10-CM

## 2024-11-04 DIAGNOSIS — I10 ESSENTIAL (PRIMARY) HYPERTENSION: ICD-10-CM

## 2024-11-04 DIAGNOSIS — E11.9 TYPE 2 DIABETES MELLITUS WITHOUT COMPLICATIONS: ICD-10-CM

## 2024-11-04 LAB
FLUAV AG NPH QL: DETECTED
FLUBV AG NPH QL: SIGNIFICANT CHANGE UP
RSV RNA NPH QL NAA+NON-PROBE: SIGNIFICANT CHANGE UP
SARS-COV-2 RNA SPEC QL NAA+PROBE: SIGNIFICANT CHANGE UP

## 2024-11-04 PROCEDURE — 0241U: CPT

## 2024-11-04 PROCEDURE — 71046 X-RAY EXAM CHEST 2 VIEWS: CPT | Mod: 26

## 2024-11-04 PROCEDURE — 99283 EMERGENCY DEPT VISIT LOW MDM: CPT | Mod: 25

## 2024-11-04 PROCEDURE — 71046 X-RAY EXAM CHEST 2 VIEWS: CPT

## 2024-11-04 PROCEDURE — 99284 EMERGENCY DEPT VISIT MOD MDM: CPT

## 2024-11-04 RX ORDER — ONDANSETRON HYDROCHLORIDE 2 MG/ML
4 INJECTION, SOLUTION INTRAMUSCULAR; INTRAVENOUS ONCE
Refills: 0 | Status: COMPLETED | OUTPATIENT
Start: 2024-11-04 | End: 2024-11-04

## 2024-11-04 RX ORDER — ONDANSETRON HYDROCHLORIDE 2 MG/ML
1 INJECTION, SOLUTION INTRAMUSCULAR; INTRAVENOUS
Qty: 20 | Refills: 0
Start: 2024-11-04

## 2024-11-04 RX ADMIN — ONDANSETRON HYDROCHLORIDE 4 MILLIGRAM(S): 2 INJECTION, SOLUTION INTRAMUSCULAR; INTRAVENOUS at 19:01

## 2024-11-04 NOTE — ED STATDOCS - OBJECTIVE STATEMENT
60 y/o Namibian speaking female with PMHx of HTN, DM on Metformin presents to the ED with chief complaint of dizziness, subjective fever, chills, myalgias, and mild productive cough x1 day. Endorses nausea/vomiting in the morning, none currently. Denies congestion, diarrhea, rash, sore throat. Denies sick contacts. Ibuprofen taken for symptoms PTA.

## 2024-11-04 NOTE — ED ADULT NURSE NOTE - NSFALLFACTORS_ED_ALL_ED
· Refill requested by: Pharmacy Fax  · Last office visit for controlled substance: 5/19/2022  · Next office visit: 9/29/2022  · Medication(s) Requested:   gabapentin (NEURONTIN) 300 MG capsule 180 capsule 3 7/22/2020     Sig - Route: Take 1 capsule by mouth 2 times daily. - Oral      · Date medication contract signed: none  · Date of last urine drug screen: none recent  Result:    · Last 3 PDMP refills: No PDMP access, please review    Can not refill without MD authorization         Dizziness

## 2024-11-04 NOTE — ED ADULT NURSE NOTE - CHIEF COMPLAINT QUOTE
Pt presents to [facility] complaining of flu like symptoms x 1 day. Pt endorsing fever unknown Tmax, chills, nausea, vomiting. Pt denies sick contacts. Pt is A & O x4, GCS 15.

## 2024-11-04 NOTE — ED STATDOCS - CLINICAL SUMMARY MEDICAL DECISION MAKING FREE TEXT BOX
Chest x-rays performed independently interpreted by myself reveal no acute findings.  Viral swab was sent.  Discharged home in good condition with supportive care.  Strict return precautions given for any worsening.  Otherwise recommend close outpatient follow-up.  Patient verbalized understanding agrees plan.

## 2024-11-04 NOTE — ED ADULT NURSE NOTE - OBJECTIVE STATEMENT
Patient presents to the ER with complaints of fever, fatigue, chills, nausea, vomiting, cough, and dizziness.

## 2024-11-04 NOTE — ED STATDOCS - PATIENT PORTAL LINK FT
You can access the FollowMyHealth Patient Portal offered by [facility] by registering at the following website: [email or web address]. By joining [employer]’s FollowMyHealth portal, you will also be able to view your health information using other applications (apps) compatible with our system.

## 2024-11-04 NOTE — ED ADULT NURSE NOTE - NSFALLRISKINTERV_ED_ALL_ED

## 2024-11-04 NOTE — ED STATDOCS - PHYSICAL EXAMINATION
Patient awake, alert, orient x 3, no acute distress  Heart sounds within normal limits, regular rate rhythm, no murmur  Lungs are clear bilaterally  Abdomen soft, nontender, nondistended.  Extremities are well-perfused  Skin without rash

## 2024-11-04 NOTE — ED STATDOCS - NSFOLLOWUPINSTRUCTIONS_ED_ALL_ED_FT
Infección respiratoria viral  Viral Respiratory Infection  Emilio infección respiratoria viral es emilio enfermedad que afecta las partes del cuerpo que utilizamos para respirar. Estas incluyen los pulmones, la nariz y la garganta. Es causada por un germen llamado virus.    Algunos ejemplos de skye tipo de infección son los siguientes:  Un resfrío.  La gripe (influenza).  Emilio infección por el virus respiratorio sincicial (VRS).  ¿Cuáles son las causas?  La causa de esta afección es un virus. Se transmite de emilio persona a otra. Puede contraer el virus si:  Inhala gotitas de emilio persona que está enferma.  Tiene contacto con personas que están enfermas.  Toca mucosidad u otro líquido de emilio persona que está enferma.  ¿Cuáles son los signos o síntomas?  Los síntomas de esta afección incluyen:  Secreción o congestión nasal.  Dolor de garganta.  Tos.  Falta de aire.  Dificultad para respirar.  Líquido pieter o amarillo en la nariz.  Otros síntomas pueden incluir:  Fiebre.  Sudoración o escalofríos.  Cansancio (fatiga).  Ivonne musculares.  Dolor de rowan.  ¿Cómo se trata?  El tratamiento de esta afección puede incluir:  Medicamentos para tratar los virus.  Medicamentos que facilitan la respiración.  Medicamentos que se pulverizan dentro de la nariz.  Paracetamol o antiinflamatorios no esteroideos (JUAN DANIEL), radha ibuprofeno, para tratar la fiebre.  Siga estas instrucciones en coyle casa:  Control del dolor y la congestión    Use los medicamentos de venta josé antonio y los recetados solamente radha se lo haya indicado el médico.  Si le duele la garganta, almas gárgaras de agua con sal. Almas esto entre 3 o 4 veces por día, según sea necesario.  Para preparar agua con sal, disuelva de ½ a 1 cucharadita (de 3 a 6 g) de sal en 1 taza (237 ml) de agua tibia. Asegúrese de que se disuelva toda la sal.  Use gotas para la nariz hechas con agua salada. Estas ayudan con la secreción (congestión). También ayudan a suavizar la piel alrededor de la nariz.  [place] 2 cucharaditas (10 ml) de miel a la hora de acostarse para disminuir la tos por la noche.  No dé miel a niños menores de 1 año.  Shanon suficiente líquido para mantener el pis (la orina) de color amarillo pálido.  Instrucciones generales    A sign telling the reader not to smoke.  Descanse todo lo que pueda.  No shanon alcohol.  No fume ni consuma ningún producto que contenga nicotina o tabaco. Si necesita ayuda para dejar de fumar, consulte al médico.  Concurra a todas las visitas de seguimiento.  ¿Cómo se obdulia?    Washing hands with soap and water.  A person covering her mouth and nose with a cloth while sneezing.  Colóquese la vacuna antigripal todos los años. Pregúntele al médico cuándo debe aplicarse la vacuna contra la gripe.  No permita que otras personas contraigan duane gérmenes. Si está enfermo:  Lávese las dain frecuentemente con agua y jabón. Lávese las dain después de toser o estornudar. Lávese las dain ashley al menos 20 segundos. Use un desinfectante para dain si no dispone de agua y jabón.  Cúbrase la boca al toser. Cúbrase la nariz y la boca cuando estornude.  No comparta vasos ni utensilios para comer.  Limpie los objetos usados comúnmente con frecuencia. Limpie las superficies que se tocan comúnmente.  Quédese en coyle casa y no concurra al trabajo ni a la escuela.  Evite el contacto con personas que están enfermas ashley la temporada de resfrío y gripe. Esta es en otoño e invierno.  Solicite ayuda si:  Los síntomas jean baptiste 10 días o más.  Los síntomas empeoran con el tiempo.  Repentinamente, siente un dolor muy intenso en el nolan o la frente.  Se hinchan mucho algunas partes de la mandíbula o del vern.  Le falta el aire.  Solicite ayuda de inmediato si:  Siente dolor u opresión en el pecho.  Tiene dificultad para respirar.  Se siente mareado o radha si fuera a desmayarse.  Sigue vomitando y empeora.  Se siente confundido.  Estos síntomas pueden indicar emilio emergencia. Solicite ayuda de inmediato. Comuníquese con el servicio de emergencias de coyle localidad (911 en los [place]).  No espere a jovita si los síntomas desaparecen.  No conduzca por duane propios medios hasta el hospital.  Resumen  Emilio infección respiratoria viral es emilio enfermedad que afecta las partes del cuerpo que utilizamos para respirar.  Entre los ejemplos de esta enfermedad, se incluyen el resfrío, la gripe y la infección por el virus respiratorio sincicial (VRS).  La infección puede causar secreción nasal, tos, dolor de garganta y fiebre.  Siga las indicaciones del médico acerca de katja medicamentos, beber gran cantidad de líquido, lavarse las dain, descansar en casa y evitar el contacto con personas enfermas.  Esta información no tiene radha fin reemplazar el consejo del médico. Asegúrese de hacerle al médico cualquier pregunta que tenga.

## 2024-11-04 NOTE — ED STATDOCS - PROGRESS NOTE DETAILS
60 y/o F with PMH of HTN, DM presetns with cough, SOB, nausea, vomiting subjective fever since yesterday. No reported sick contacts. PE: Well appearing. Cardiac: s1s2, RRR. Lungs: CTAB. Abdomen: NBS x4 soft, nontender. A/P: Likely viral syndrome, plan for CXR, viral swab, dc home. - Renny Cast PA-C

## 2024-11-05 RX ORDER — OSELTAMIVIR PHOSPHATE 6 MG/ML
1 FOR SUSPENSION ORAL
Qty: 10 | Refills: 0
Start: 2024-11-05

## 2024-12-19 NOTE — ED ADULT NURSE NOTE - TELEPHONIC ID NUMBER OF THE INTERPRETER
I like that because otherwise he is getting good bug down the details.  You said you are going to reduce the dose I still felt bad right that kind of thing right [id number]

## 2025-05-16 ENCOUNTER — RESULT REVIEW (OUTPATIENT)
Age: 60
End: 2025-05-16

## 2025-06-03 ENCOUNTER — OUTPATIENT (OUTPATIENT)
Dept: OUTPATIENT SERVICES | Facility: HOSPITAL | Age: 60
LOS: 1 days | End: 2025-06-03
Payer: COMMERCIAL

## 2025-06-03 ENCOUNTER — RESULT REVIEW (OUTPATIENT)
Age: 60
End: 2025-06-03

## 2025-06-03 ENCOUNTER — APPOINTMENT (OUTPATIENT)
Dept: MAMMOGRAPHY | Facility: CLINIC | Age: 60
End: 2025-06-03
Payer: COMMERCIAL

## 2025-06-03 DIAGNOSIS — Z01.419 ENCOUNTER FOR GYNECOLOGICAL EXAMINATION (GENERAL) (ROUTINE) WITHOUT ABNORMAL FINDINGS: ICD-10-CM

## 2025-06-03 PROCEDURE — 77067 SCR MAMMO BI INCL CAD: CPT | Mod: 26

## 2025-06-03 PROCEDURE — 77063 BREAST TOMOSYNTHESIS BI: CPT

## 2025-06-03 PROCEDURE — 77067 SCR MAMMO BI INCL CAD: CPT

## 2025-06-03 PROCEDURE — 77063 BREAST TOMOSYNTHESIS BI: CPT | Mod: 26
